# Patient Record
Sex: FEMALE | Race: WHITE | NOT HISPANIC OR LATINO | Employment: FULL TIME | URBAN - METROPOLITAN AREA
[De-identification: names, ages, dates, MRNs, and addresses within clinical notes are randomized per-mention and may not be internally consistent; named-entity substitution may affect disease eponyms.]

---

## 2017-05-22 ENCOUNTER — ALLSCRIPTS OFFICE VISIT (OUTPATIENT)
Dept: OTHER | Facility: OTHER | Age: 27
End: 2017-05-22

## 2017-05-24 ENCOUNTER — ALLSCRIPTS OFFICE VISIT (OUTPATIENT)
Dept: OTHER | Facility: OTHER | Age: 27
End: 2017-05-24

## 2017-06-09 ENCOUNTER — ALLSCRIPTS OFFICE VISIT (OUTPATIENT)
Dept: OTHER | Facility: OTHER | Age: 27
End: 2017-06-09

## 2017-06-13 ENCOUNTER — ALLSCRIPTS OFFICE VISIT (OUTPATIENT)
Dept: OTHER | Facility: OTHER | Age: 27
End: 2017-06-13

## 2017-06-19 ENCOUNTER — LAB CONVERSION - ENCOUNTER (OUTPATIENT)
Dept: OTHER | Facility: OTHER | Age: 27
End: 2017-06-19

## 2017-06-19 LAB
ADDITIONAL INFORMATION (HISTORICAL): NORMAL
ADEQUACY: (HISTORICAL): NORMAL
COMMENT (HISTORICAL): NORMAL
CYTOTECHNOLOGIST: (HISTORICAL): NORMAL
HPV MRNA E6/E7 (HISTORICAL): NOT DETECTED
INTERPRETATION (HISTORICAL): NORMAL
LMP (HISTORICAL): NORMAL
PREV. BX: (HISTORICAL): NORMAL
PREV. PAP (HISTORICAL): NORMAL
REVIEWED BY (HISTORICAL): NORMAL
SOURCE (HISTORICAL): NORMAL

## 2017-09-26 ENCOUNTER — GENERIC CONVERSION - ENCOUNTER (OUTPATIENT)
Dept: OTHER | Facility: OTHER | Age: 27
End: 2017-09-26

## 2017-10-09 ENCOUNTER — ALLSCRIPTS OFFICE VISIT (OUTPATIENT)
Dept: OTHER | Facility: OTHER | Age: 27
End: 2017-10-09

## 2017-10-12 LAB
EXTERNAL ABO GROUPING: NORMAL
EXTERNAL ANTIBODY SCREEN: NORMAL
EXTERNAL HEMATOCRIT: 39.4 %
EXTERNAL HEMOGLOBIN: 13.1 G/DL
EXTERNAL HEPATITIS B SURFACE ANTIGEN: NEGATIVE
EXTERNAL HIV-1 ANTIBODY: NEGATIVE
EXTERNAL PLATELET COUNT: 345 K/ΜL
EXTERNAL RH FACTOR: POSITIVE
EXTERNAL RUBELLA IGG QUANTITATION: NORMAL

## 2017-10-13 ENCOUNTER — LAB CONVERSION - ENCOUNTER (OUTPATIENT)
Dept: OTHER | Facility: OTHER | Age: 27
End: 2017-10-13

## 2017-10-13 LAB
AB SCRN, RBC W/RFLX ID,TITER,AG (HISTORICAL): NORMAL
BACTERIA UR QL AUTO: ABNORMAL /HPF
BILIRUB UR QL STRIP: NEGATIVE
COLOR UR: YELLOW
COMMENT (HISTORICAL): CLEAR
FECAL OCCULT BLOOD DIAGNOSTIC (HISTORICAL): ABNORMAL
GLUCOSE (HISTORICAL): NEGATIVE
HYALINE CASTS #/AREA URNS LPF: ABNORMAL /LPF
KETONES UR STRIP-MCNC: NEGATIVE MG/DL
LEUKOCYTE ESTERASE UR QL STRIP: NEGATIVE
NITRITE UR QL STRIP: NEGATIVE
PH UR STRIP.AUTO: 6 [PH] (ref 5–8)
PROT UR STRIP-MCNC: NEGATIVE MG/DL
RBC (HISTORICAL): ABNORMAL /HPF
RPR SCREEN (HISTORICAL): NORMAL
SP GR UR STRIP.AUTO: 1.01 (ref 1–1.03)
SQUAMOUS EPITHELIAL CELLS (HISTORICAL): ABNORMAL /HPF
WBC # BLD AUTO: ABNORMAL /HPF

## 2017-10-16 ENCOUNTER — LAB CONVERSION - ENCOUNTER (OUTPATIENT)
Dept: OTHER | Facility: OTHER | Age: 27
End: 2017-10-16

## 2017-10-16 LAB
AB SCRN, RBC W/RFLX ID,TITER,AG (HISTORICAL): NORMAL
ANTI-NUCLEAR ANTIBODY (ANA) (HISTORICAL): NEGATIVE
BACTERIA UR QL AUTO: ABNORMAL /HPF
BILIRUB UR QL STRIP: NEGATIVE
COLOR UR: YELLOW
COMMENT (HISTORICAL): CLEAR
FECAL OCCULT BLOOD DIAGNOSTIC (HISTORICAL): ABNORMAL
GLUCOSE (HISTORICAL): NEGATIVE
HYALINE CASTS #/AREA URNS LPF: ABNORMAL /LPF
KETONES UR STRIP-MCNC: NEGATIVE MG/DL
LEUKOCYTE ESTERASE UR QL STRIP: NEGATIVE
NITRITE UR QL STRIP: NEGATIVE
PH UR STRIP.AUTO: 6 [PH] (ref 5–8)
PROT UR STRIP-MCNC: NEGATIVE MG/DL
RBC (HISTORICAL): ABNORMAL /HPF
RPR SCREEN (HISTORICAL): NORMAL
RUBELLA, IGG (HISTORICAL): <0.9 INDEX
SP GR UR STRIP.AUTO: 1.01 (ref 1–1.03)
SQUAMOUS EPITHELIAL CELLS (HISTORICAL): ABNORMAL /HPF
WBC # BLD AUTO: ABNORMAL /HPF

## 2017-10-17 ENCOUNTER — LAB CONVERSION - ENCOUNTER (OUTPATIENT)
Dept: OTHER | Facility: OTHER | Age: 27
End: 2017-10-17

## 2017-10-17 LAB
AB SCRN, RBC W/RFLX ID,TITER,AG (HISTORICAL): NORMAL
ABO GROUP BLD: NORMAL
ANTI-NUCLEAR ANTIBODY (ANA) (HISTORICAL): NEGATIVE
BACTERIA UR QL AUTO: ABNORMAL /HPF
BASOPHILS # BLD AUTO: 0.3 %
BASOPHILS # BLD AUTO: 39 CELLS/UL (ref 0–200)
BILIRUB UR QL STRIP: NEGATIVE
COLOR UR: YELLOW
COMMENT (HISTORICAL): CLEAR
DEPRECATED RDW RBC AUTO: 12 % (ref 11–15)
EOSINOPHIL # BLD AUTO: 0.8 %
EOSINOPHIL # BLD AUTO: 105 CELLS/UL (ref 15–500)
FECAL OCCULT BLOOD DIAGNOSTIC (HISTORICAL): ABNORMAL
GLUCOSE (HISTORICAL): NEGATIVE
HCT VFR BLD AUTO: 39.4 % (ref 35–45)
HEPATITIS B SURFACE ANTIGEN (HISTORICAL): NORMAL
HGB BLD-MCNC: 13.1 G/DL (ref 11.7–15.5)
HIV AG/AB, 4TH GEN (HISTORICAL): NORMAL
HYALINE CASTS #/AREA URNS LPF: ABNORMAL /LPF
INTERPRETATION (HISTORICAL): NORMAL
KETONES UR STRIP-MCNC: NEGATIVE MG/DL
LEUKOCYTE ESTERASE UR QL STRIP: NEGATIVE
LYMPHOCYTES # BLD AUTO: 25.9 %
LYMPHOCYTES # BLD AUTO: 3393 CELLS/UL (ref 850–3900)
MCH RBC QN AUTO: 30.2 PG (ref 27–33)
MCHC RBC AUTO-ENTMCNC: 33.2 G/DL (ref 32–36)
MCV RBC AUTO: 90.8 FL (ref 80–100)
MISCELLANEOUS LAB TEST RESULT (HISTORICAL): NORMAL
MONOCYTES # BLD AUTO: 1205 CELLS/UL (ref 200–950)
MONOCYTES (HISTORICAL): 9.2 %
NEUTROPHILS # BLD AUTO: 63.8 %
NEUTROPHILS # BLD AUTO: 8358 CELLS/UL (ref 1500–7800)
NITRITE UR QL STRIP: NEGATIVE
PH UR STRIP.AUTO: 6 [PH] (ref 5–8)
PLATELET # BLD AUTO: 345 THOUSAND/UL (ref 140–400)
PMV BLD AUTO: 10.2 FL (ref 7.5–12.5)
PROT UR STRIP-MCNC: NEGATIVE MG/DL
RBC # BLD AUTO: 4.34 MILLION/UL (ref 3.8–5.1)
RBC (HISTORICAL): ABNORMAL /HPF
REVIEWED BY (HISTORICAL): NORMAL
RH BLD: NORMAL
RPR SCREEN (HISTORICAL): NORMAL
RUBELLA, IGG (HISTORICAL): <0.9 INDEX
SP GR UR STRIP.AUTO: 1.01 (ref 1–1.03)
SQUAMOUS EPITHELIAL CELLS (HISTORICAL): ABNORMAL /HPF
TOXOPLASMA GONDII IGG (HISTORICAL): 34.1 IU/ML
TOXOPLASMA IGM ANTIBODY (HISTORICAL): <8 AU/ML
WBC # BLD AUTO: 13.1 THOUSAND/UL (ref 3.8–10.8)
WBC # BLD AUTO: ABNORMAL /HPF

## 2017-10-31 ENCOUNTER — ALLSCRIPTS OFFICE VISIT (OUTPATIENT)
Dept: OTHER | Facility: OTHER | Age: 27
End: 2017-10-31

## 2017-10-31 LAB
EXTERNAL CHLAMYDIA SCREEN: NEGATIVE
EXTERNAL GONORRHEA SCREEN: NEGATIVE
EXTERNAL SYPHILIS RPR SCREEN: NORMAL

## 2017-11-03 LAB
CLINICAL COMMENT (HISTORICAL): NORMAL
CULT RSLT GENITAL (HISTORICAL): NORMAL

## 2017-11-13 ENCOUNTER — GENERIC CONVERSION - ENCOUNTER (OUTPATIENT)
Dept: OTHER | Facility: OTHER | Age: 27
End: 2017-11-13

## 2017-11-28 ENCOUNTER — GENERIC CONVERSION - ENCOUNTER (OUTPATIENT)
Dept: OTHER | Facility: OTHER | Age: 27
End: 2017-11-28

## 2017-12-04 ENCOUNTER — ALLSCRIPTS OFFICE VISIT (OUTPATIENT)
Dept: OTHER | Facility: OTHER | Age: 27
End: 2017-12-04

## 2017-12-18 ENCOUNTER — ALLSCRIPTS OFFICE VISIT (OUTPATIENT)
Dept: OTHER | Facility: OTHER | Age: 27
End: 2017-12-18

## 2017-12-26 ENCOUNTER — GENERIC CONVERSION - ENCOUNTER (OUTPATIENT)
Dept: OTHER | Facility: OTHER | Age: 27
End: 2017-12-26

## 2017-12-26 DIAGNOSIS — Z34.90 ENCOUNTER FOR SUPERVISION OF NORMAL PREGNANCY: ICD-10-CM

## 2018-01-09 ENCOUNTER — LAB CONVERSION - ENCOUNTER (OUTPATIENT)
Dept: OTHER | Facility: OTHER | Age: 28
End: 2018-01-09

## 2018-01-09 LAB — QUESTION/PROBLEM (HISTORICAL): NORMAL

## 2018-01-13 VITALS
SYSTOLIC BLOOD PRESSURE: 132 MMHG | WEIGHT: 146.13 LBS | HEIGHT: 62 IN | BODY MASS INDEX: 26.89 KG/M2 | DIASTOLIC BLOOD PRESSURE: 86 MMHG

## 2018-01-13 VITALS
HEIGHT: 62 IN | SYSTOLIC BLOOD PRESSURE: 120 MMHG | WEIGHT: 148.5 LBS | BODY MASS INDEX: 27.33 KG/M2 | DIASTOLIC BLOOD PRESSURE: 76 MMHG

## 2018-01-13 VITALS
HEIGHT: 62 IN | SYSTOLIC BLOOD PRESSURE: 120 MMHG | WEIGHT: 146.5 LBS | BODY MASS INDEX: 26.96 KG/M2 | DIASTOLIC BLOOD PRESSURE: 78 MMHG

## 2018-01-13 VITALS
WEIGHT: 148 LBS | HEIGHT: 62 IN | BODY MASS INDEX: 27.23 KG/M2 | SYSTOLIC BLOOD PRESSURE: 120 MMHG | DIASTOLIC BLOOD PRESSURE: 68 MMHG

## 2018-01-13 NOTE — PROGRESS NOTES
Assessment    1  Current tobacco use (305 1) (Z72 0)   2  Encounter for preventive health examination (V70 0) (Z00 00)    Plan  Influenza vaccine needed    · Fluzone Quadrivalent 0 5 ML Intramuscular Suspension    Discussion/Summary  health maintenance visit Currently, she eats an adequate diet  cervical cancer screening is current Sees Dr Rosemary Valle, gyn, yearly PAPs given hx of abn paps s/p colposcopy Breast cancer screening: breast cancer screening is not indicated  Colorectal cancer screening: colorectal cancer screening is not indicated  Osteoporosis screening: bone mineral density testing is not indicated  Advice and education were given regarding tobacco cessation  Patient discussion: discussed with the patient  31 yr old female here for annual exam, doing well, discussed tobacco cessation at length, not interested in patch or chantix at this time, only smoking rarely  Will continue to follow up with pt to see how quitting efforts are going  Continue with gyn care at Dr Rosemary Valle office  No school or work forms needed today  RTC in 1 yr for annual physical exam or sooner as needed  Chief Complaint  annual physical exam      History of Present Illness  HM, Adult Female: The last health maintenance visit was 1 year(s) ago  General Health: The patient's health since the last visit is described as good  She has regular dental visits  She denies hearing loss  Immunizations status: up to date   wears glasses  Lifestyle:  She consumes a diverse and healthy diet  She does not have any weight concerns  She exercises regularly  She uses tobacco  She denies drug use  Screening:   HPI: 31 yr old female here for annual physical exam, no acute concerns or complaints, doing well  Does admit she has picked up smoking, occasional cigarettes, usually 2 or 3 per day when she does smoke, usually when she is in her car  Helps to keep her calm  Work is her biggest stressor, works at [employer]        Review of Systems    Constitutional: not feeling poorly and not feeling tired  Eyes: no dryness of the eyes and no purulent discharge from the eyes  ENT: no nosebleeds, no hearing loss and no nasal discharge  Cardiovascular: no chest pain and no palpitations  Respiratory: no shortness of breath, no cough and no wheezing  Gastrointestinal: no abdominal pain, no nausea, no vomiting, no constipation and no diarrhea  Genitourinary: no dysuria  Musculoskeletal: no arthralgias  Integumentary: no rashes and no skin lesions  Neurological: no headache, no confusion, no dizziness, no limb weakness, no convulsions and no difficulty walking  Psychiatric: as noted in HPI, not suicidal, no sleep disturbances and no depression  Hematologic/Lymphatic: no tendency for easy bleeding and no tendency for easy bruising  Active Problems    1  Acute cystitis with hematuria (595 0) (N30 01)   2  Bacterial vaginosis (616 10,041 9) (N76 0,B96 89)   3  History of abnormal cervical Pap smear (V13 29) (T19 546)   4  Influenza vaccine needed (V04 81) (Z23)   5  UTI (urinary tract infection) (599 0) (N39 0)    Surgical History    · History of Colposcopy With Biopsy    Family History  Father    · Family history of factor V Leiden mutation (V18 3) (Z83 2)    Social History    · Never a smoker    Current Meds   1  NuvaRing 0 12-0 015 MG/24HR Vaginal Ring; Therapy: (Recorded:05Ogx1656) to Recorded    Allergies    1  No Known Drug Allergies    2  No Known Environmental Allergies   3  No Known Food Allergies    Vitals   Recorded: 17Nov2016 12:57PM   Temperature 98 2 F   Heart Rate 76   Respiration 18   Systolic 166   Diastolic 82   Height 5 ft 2 in   Weight 142 lb    BMI Calculated 25 97   BSA Calculated 1 65     Physical Exam    Constitutional   General appearance: No acute distress, well appearing and well nourished  Head and Face   Head and face: Normal     Palpation of the face and sinuses: No sinus tenderness      Eyes Conjunctiva and lids: No swelling, erythema or discharge  Pupils and irises: Equal, round, reactive to light  Ears, Nose, Mouth, and Throat   External inspection of ears and nose: Normal     Otoscopic examination: Tympanic membranes translucent with normal light reflex  Canals patent without erythema  Hearing: Normal     Nasal mucosa, septum, and turbinates: Normal without edema or erythema  Lips, teeth, and gums: Normal, good dentition  Oropharynx: Normal with no erythema, edema, exudate or lesions  Neck   Neck: Supple, symmetric, trachea midline, no masses  Pulmonary   Respiratory effort: No increased work of breathing or signs of respiratory distress  Auscultation of lungs: Clear to auscultation  Cardiovascular   Auscultation of heart: Normal rate and rhythm, normal S1 and S2, no murmurs  Lymphatic   Palpation of lymph nodes in neck: No lymphadenopathy  Musculoskeletal   Gait and station: Normal     Digits and nails: Normal without clubbing or cyanosis  Joints, bones, and muscles: Normal     Range of motion: Normal     Muscle strength/tone: Normal     Skin   Skin and subcutaneous tissue: Normal without rashes or lesions  Neurologic   Cranial nerves: Cranial nerves II-XII intact  Psychiatric   Mood and affect: Normal        Results/Data  PHQ-9 Adult Depression Screening [id number] 01:42PM [facility]     Test Name Result Flag Reference   PHQ-9 Adult Depression Score 3     Over the last two weeks, how often have you been bothered by any of the following problems?   Little interest or pleasure in doing things: Not at all - 0  Feeling down, depressed, or hopeless: Several days - 1  Trouble falling or staying asleep, or sleeping too much: Several days - 1  Feeling tired or having little energy: Several days - 1  Poor appetite or over eating: Not at all - 0  Feeling bad about yourself - or that you are a failure or have let yourself or your family down: Not at all - 0  Trouble concentrating on things, such as reading the newspaper or watching television: Not at all - 0  Moving or speaking so slowly that other people could have noticed  Or the opposite -  being so fidgety or restless that you have been moving around a lot more than usual: Not at all - 0  Thoughts that you would be better off dead, or of hurting yourself in some way: Not at all - 0   PHQ-9 Adult Depression Screening Negative     PHQ-9 Difficulty Level Somewhat difficult     PHQ-9 Severity Minimal Depression         Attending Note  Attending Note: Attending Note: I discussed the case with the Resident and reviewed the Resident's note and I agree with the Resident management plan as it was presented to me  Level of Participation: I was present in clinic, but did not examine the patient  Diagnosis and Plan: Health maintenance: encourage smoking cessation  I agree with the Resident's note        Signatures   Electronically signed by : Alexandro Mandujano MD; Nov 28 2016  1:47PM EST                       (Author)    Electronically signed by : ALEC Craven ; Nov 30 2016  1:27PM EST                       (Author)

## 2018-01-14 VITALS
WEIGHT: 146.38 LBS | SYSTOLIC BLOOD PRESSURE: 132 MMHG | HEIGHT: 62 IN | BODY MASS INDEX: 26.94 KG/M2 | DIASTOLIC BLOOD PRESSURE: 84 MMHG

## 2018-01-14 VITALS
WEIGHT: 152.25 LBS | SYSTOLIC BLOOD PRESSURE: 124 MMHG | HEIGHT: 62 IN | DIASTOLIC BLOOD PRESSURE: 70 MMHG | BODY MASS INDEX: 28.02 KG/M2

## 2018-01-15 ENCOUNTER — ALLSCRIPTS OFFICE VISIT (OUTPATIENT)
Dept: PERINATAL CARE | Facility: CLINIC | Age: 28
End: 2018-01-15
Payer: COMMERCIAL

## 2018-01-15 ENCOUNTER — GENERIC CONVERSION - ENCOUNTER (OUTPATIENT)
Dept: OTHER | Facility: OTHER | Age: 28
End: 2018-01-15

## 2018-01-15 PROCEDURE — 76805 OB US >/= 14 WKS SNGL FETUS: CPT | Performed by: OBSTETRICS & GYNECOLOGY

## 2018-01-15 PROCEDURE — 76817 TRANSVAGINAL US OBSTETRIC: CPT | Performed by: OBSTETRICS & GYNECOLOGY

## 2018-01-15 NOTE — MISCELLANEOUS
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

## 2018-01-16 ENCOUNTER — OB ABSTRACT (OUTPATIENT)
Dept: OBGYN CLINIC | Facility: CLINIC | Age: 28
End: 2018-01-16

## 2018-01-22 VITALS
BODY MASS INDEX: 26.8 KG/M2 | HEIGHT: 64 IN | DIASTOLIC BLOOD PRESSURE: 70 MMHG | WEIGHT: 157 LBS | SYSTOLIC BLOOD PRESSURE: 128 MMHG

## 2018-01-22 VITALS
DIASTOLIC BLOOD PRESSURE: 60 MMHG | SYSTOLIC BLOOD PRESSURE: 120 MMHG | WEIGHT: 152.38 LBS | BODY MASS INDEX: 28.04 KG/M2 | HEIGHT: 62 IN

## 2018-01-23 VITALS
DIASTOLIC BLOOD PRESSURE: 60 MMHG | RESPIRATION RATE: 16 BRPM | WEIGHT: 157.5 LBS | SYSTOLIC BLOOD PRESSURE: 112 MMHG | HEART RATE: 72 BPM | BODY MASS INDEX: 26.89 KG/M2 | TEMPERATURE: 97.2 F | HEIGHT: 64 IN

## 2018-01-23 NOTE — MISCELLANEOUS
Message  Return to work or school:   Alireza Crowder is under my professional care   She was seen in my office on 12/18/2017    She is not able to return to work until 12/19/2017           Signatures   Electronically signed by : ALEC Beltran ; Dec 18 2017  2:55PM EST                       (Author)

## 2018-01-23 NOTE — PROGRESS NOTES
XIANG 15 2018         RE: Radha Vargas                                To: [facility]   MR#: [id number]                                   [address]   : [address]: [id number]                             Fax: [phone]   (Exam #: [id number])      The LMP of this 28year old,  G1, P0-0-0-0 patient was AUG 14 2017, her   working RAFFY is 2018 and the current gestational age is 25 weeks 0   days by [address] [place] [address]  A sonographic examination was performed on XIANG   15 2018 using real time equipment  The ultrasound examination was   performed using abdominal & vaginal techniques  The patient has a BMI of   28 3  Her blood pressure today was 136/67  Earliest US on record:  10/31/17  9w1d  RAFFY  18  Cardiac motion was observed at 148 bpm       INDICATIONS      fetal anatomical survey      Exam Types      LEVEL II   Transvaginal      RESULTS      Fetus # 1 of 1   Vertex presentation   Fetal growth appeared normal   Placenta Location = Posterior, right lateral   No placenta previa   Placenta Grade = I      MEASUREMENTS (* Included In Average GA)      AC              15 6 cm        20 weeks 3 days* (63%)   BPD              4 9 cm        20 weeks 6 days* (77%)   HC              18 2 cm        20 weeks 4 days* (65%)   Femur            3 4 cm        21 weeks 0 days* (64%)      Nuchal Fold      3 8 mm   NBL              8 2 mm      Humerus          3 1 cm        20 weeks 2 days  (64%)      Cerebellum       2 4 cm        22 weeks 6 days   CisternaMagna    5 7 mm      HC/AC           1 17   FL/AC           0 22   FL/BPD          0 71   EFW (Ac/Fl/Hc)   372 grams - 0 lbs 13 oz      THE AVERAGE GESTATIONAL AGE is 20 weeks 5 days +/- 10 days        AMNIOTIC FLUID         Largest Vertical Pocket = 6 3 cm   Amniotic Fluid: Normal      CERVICAL EVALUATION      SUPINE      Cervical Length: 4 10 cm      OTHER TEST RESULTS Funneling?: No             Dynamic Changes?: No        Resp  To TFP?: No      ANATOMY      Head                                    Normal   Face/Neck                               Normal   Th  Cav  Normal   Heart                                   See Details   Abd  Cav  Normal   Stomach                                 Normal   Right Kidney                            Normal   Left Kidney                             Normal   Bladder                                 Normal   Abd  Wall                               Normal   Spine                                   Normal   Extrems                                 Normal   Genitalia                               Normal   Placenta                                Normal   Umbl  Cord                              Normal   Uterus                                  Normal   PCI                                     Normal      ANATOMY DETAILS      Visualized Appearing Sonographically Normal:   HEAD: (Calvarium, BPD Level, Cavum, Lateral Ventricles, Choroid Plexus,   Cerebellum, Cisterna Magna);    FACE/NECK: (Neck, Nuchal Fold, Profile,   Orbits, Nose/Lips, Palate, Face);    TH  CAV  : (Lungs, Diaphragm); HEART: (Four Chamber View, Proximal Left Outflow, Proximal Right Outflow,   3VV, 3 Vessel Trachea, Short Axis of Greater Vessels, Aortic Arch,   Interventricular Septum, Interatrial Septum, IVC, SVC, Cardiac Axis,   Cardiac Position);    ABD  CAV : (Liver, Gall Bladder);    STOMACH, RIGHT   KIDNEY, LEFT KIDNEY, BLADDER, ABD  WALL, SPINE: (Cervical Spine, Thoracic   Spine, Lumbar Spine, Sacrum);    EXTREMS: (Lt Humerus, Rt Humerus, Lt   Forearm, Rt Forearm, Lt Hand, Rt Hand, Lt Femur, Rt Femur, Lt Low Leg, Rt   Low Leg, Lt Foot, Rt Foot);    GENITALIA, PLACENTA, UMBL   CORD, UTERUS, PCI      Suboptimally Visualized:   HEART: (Ductal Arch)      ADNEXA      The left ovary appeared normal and measured 2 9 x 3 1 x 2 5 cm with a   volume of 11 6 cc  The right ovary was not visualized  IMPRESSION      Christopher IUP   20 weeks and 5 days by this ultrasound  (RAFFY=MAY 30 2018)   20 weeks and 0 days by 1st Tri Sono  (RAFFY=2018)   Vertex presentation   Fetal growth appeared normal   Regular fetal heart rate of 148 bpm   Posterior, right lateral placenta   No placenta previa      GENERAL COMMENT      Ms Genevia Fleischer is here for anatomy survey and cervical length screening  There is a single live intrauterine pregnancy with size equivalent to   dates  No gross anomalies were identified  Amniotic fluid is within normal limits  Transvaginal cervical length measures 41mm indicating low risk for   spontaneous  birth  Evaluation and Management:   The patient was counseled regarding the above findings  A total of 10   minutes were spent in this encounter with >50% of the time spent in   face-to-face counseling and in coordination of care  The limitations of   ultrasound were reviewed  We discussed that while the presence of a normal appearing ultrasound is   reassuring, it does not completely preclude the presence of aneuploidy or   malformation  Quad Screen results are interpreted as Screen Negative   based on risk of Down syndrome of <1:5000, risk of trisomy 18 of <1:5000,   and risk of open neural tube defect of 1:1853  I strongly recommended consideration of influenza vaccination for her   partner  At the conclusion of today's encounter, all questions were answered to her   satisfaction  Thank you very much for this kind referral and please do   not hesitate to contact me with any further questions or concerns  RISHI Rocha M D     Maternal Fetal Medicine   Electronically signed 01/15/18 11:17
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
## 2018-01-24 VITALS
DIASTOLIC BLOOD PRESSURE: 67 MMHG | HEIGHT: 64 IN | BODY MASS INDEX: 28.17 KG/M2 | SYSTOLIC BLOOD PRESSURE: 136 MMHG | WEIGHT: 165 LBS

## 2018-01-24 VITALS
HEIGHT: 64 IN | DIASTOLIC BLOOD PRESSURE: 70 MMHG | SYSTOLIC BLOOD PRESSURE: 132 MMHG | BODY MASS INDEX: 27.02 KG/M2 | WEIGHT: 158.25 LBS

## 2018-01-25 ENCOUNTER — ROUTINE PRENATAL (OUTPATIENT)
Dept: OBGYN CLINIC | Facility: CLINIC | Age: 28
End: 2018-01-25

## 2018-01-25 VITALS — WEIGHT: 162 LBS | SYSTOLIC BLOOD PRESSURE: 110 MMHG | BODY MASS INDEX: 28.25 KG/M2 | DIASTOLIC BLOOD PRESSURE: 74 MMHG

## 2018-01-25 DIAGNOSIS — Z3A.23 23 WEEKS GESTATION OF PREGNANCY: Primary | ICD-10-CM

## 2018-01-25 PROCEDURE — PNV: Performed by: NURSE PRACTITIONER

## 2018-02-22 ENCOUNTER — ROUTINE PRENATAL (OUTPATIENT)
Dept: OBGYN CLINIC | Facility: CLINIC | Age: 28
End: 2018-02-22

## 2018-02-22 VITALS — SYSTOLIC BLOOD PRESSURE: 108 MMHG | WEIGHT: 168.4 LBS | DIASTOLIC BLOOD PRESSURE: 60 MMHG | BODY MASS INDEX: 29.36 KG/M2

## 2018-02-22 DIAGNOSIS — Z34.02 ENCOUNTER FOR SUPERVISION OF NORMAL FIRST PREGNANCY IN SECOND TRIMESTER: Primary | ICD-10-CM

## 2018-02-22 PROCEDURE — PNV: Performed by: NURSE PRACTITIONER

## 2018-03-07 NOTE — PROGRESS NOTES
Education  Baby & Me Education 1st Trimester:   First Trimester Education provided:   benefits of breastfeeding, importance of exclusive breastfeeding, early initiation of breastfeeding and exclusive breastfeeding for the first 6 months   The patient is planning on breastfeeding     Prenatal education provided by: Donita King RN      Signatures   Electronically signed by : Donita King RN; Oct  9 2017  4:52PM EST                       (Author)

## 2018-03-09 LAB
EXTERNAL HEMATOCRIT: 35.9 %
EXTERNAL HEMOGLOBIN: 12.1 G/DL
EXTERNAL PLATELET COUNT: 324 K/ΜL
EXTERNAL SYPHILIS RPR SCREEN: NORMAL

## 2018-03-12 ENCOUNTER — ROUTINE PRENATAL (OUTPATIENT)
Dept: OBGYN CLINIC | Facility: CLINIC | Age: 28
End: 2018-03-12

## 2018-03-12 VITALS — BODY MASS INDEX: 29.75 KG/M2 | WEIGHT: 170.6 LBS | DIASTOLIC BLOOD PRESSURE: 78 MMHG | SYSTOLIC BLOOD PRESSURE: 110 MMHG

## 2018-03-12 DIAGNOSIS — Z34.82 PRENATAL CARE, SUBSEQUENT PREGNANCY, SECOND TRIMESTER: ICD-10-CM

## 2018-03-12 DIAGNOSIS — Z36.9 ANTENATAL SCREENING ENCOUNTER: Primary | ICD-10-CM

## 2018-03-12 LAB
HCT VFR BLD AUTO: 35.9 % (ref 35–45)
HGB BLD-MCNC: 12.1 G/DL (ref 11.7–15.5)
PLATELET # BLD AUTO: 324 THOUSAND/UL (ref 140–400)
RPR SER QL: NORMAL

## 2018-03-12 PROCEDURE — PNV: Performed by: OBSTETRICS & GYNECOLOGY

## 2018-03-12 NOTE — PROGRESS NOTES
The 28 week lab work was reviewed, patient still needs to make arrangements for Glucola    She will be going to [place] for vacation precautions were given

## 2018-03-27 ENCOUNTER — OB ABSTRACT (OUTPATIENT)
Dept: OBGYN CLINIC | Facility: CLINIC | Age: 28
End: 2018-03-27

## 2018-03-27 LAB — GLUCOSE 1H P 50 G GLC PO SERPL-MCNC: 102 MG/DL

## 2018-03-29 ENCOUNTER — TELEPHONE (OUTPATIENT)
Dept: OBGYN CLINIC | Facility: CLINIC | Age: 28
End: 2018-03-29
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
## 2018-03-30 ENCOUNTER — HOSPITAL ENCOUNTER (OUTPATIENT)
Facility: HOSPITAL | Age: 28
Discharge: HOME | End: 2018-03-30
Attending: OBSTETRICS & GYNECOLOGY | Admitting: OBSTETRICS & GYNECOLOGY
Payer: COMMERCIAL

## 2018-03-30 ENCOUNTER — TELEPHONE (OUTPATIENT)
Dept: OBGYN CLINIC | Facility: CLINIC | Age: 28
End: 2018-03-30

## 2018-03-30 VITALS
WEIGHT: 173 LBS | HEIGHT: 62 IN | TEMPERATURE: 98.4 F | BODY MASS INDEX: 31.83 KG/M2 | SYSTOLIC BLOOD PRESSURE: 124 MMHG | RESPIRATION RATE: 18 BRPM | HEART RATE: 103 BPM | DIASTOLIC BLOOD PRESSURE: 82 MMHG

## 2018-03-30 PROCEDURE — 99214 OFFICE O/P EST MOD 30 MIN: CPT

## 2018-03-30 NOTE — TELEPHONE ENCOUNTER
Pt called to inform she is 30 weeks pregnant and bleeding like a period  Had last night and again this morning  Still actively bleeding  Spoke with Dr July Hall and said to send to triage  Patient and triage notified
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
## 2018-04-02 ENCOUNTER — ROUTINE PRENATAL (OUTPATIENT)
Dept: OBGYN CLINIC | Facility: CLINIC | Age: 28
End: 2018-04-02

## 2018-04-02 VITALS — SYSTOLIC BLOOD PRESSURE: 112 MMHG | WEIGHT: 173 LBS | BODY MASS INDEX: 31.64 KG/M2 | DIASTOLIC BLOOD PRESSURE: 72 MMHG

## 2018-04-02 DIAGNOSIS — Z34.03 ENCOUNTER FOR SUPERVISION OF NORMAL FIRST PREGNANCY IN THIRD TRIMESTER: Primary | ICD-10-CM

## 2018-04-02 PROCEDURE — PNV: Performed by: NURSE PRACTITIONER
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
## 2018-04-02 NOTE — PROGRESS NOTES
Patient is doing well  Denies LOF/Cramping  No bleeding since last week  Baby is moving well  She having a baby girl  Fetal kick counts reviewed  Sheet given  All questions and concerns addressed and patient verbalized understanding  Return in 2 weeks

## 2018-04-19 ENCOUNTER — ROUTINE PRENATAL (OUTPATIENT)
Dept: OBGYN CLINIC | Facility: CLINIC | Age: 28
End: 2018-04-19

## 2018-04-19 VITALS — BODY MASS INDEX: 32.37 KG/M2 | WEIGHT: 177 LBS | DIASTOLIC BLOOD PRESSURE: 82 MMHG | SYSTOLIC BLOOD PRESSURE: 128 MMHG

## 2018-04-19 DIAGNOSIS — Z34.03 ENCOUNTER FOR SUPERVISION OF NORMAL FIRST PREGNANCY IN THIRD TRIMESTER: Primary | ICD-10-CM

## 2018-04-19 PROCEDURE — PNV: Performed by: NURSE PRACTITIONER

## 2018-04-19 NOTE — PROGRESS NOTES
Patient is doing well  Denies LOF/Bleeding/Cramping  Patient c/o neck pain that shoots into her shoulder and back  This has been going on for 5 days  She has tried ice, heat and tylenol with temporary improvement  Some recommendations given today were a prenatal neck, upper back massage or physical therapy  Patient will try the prenatal massage  If no improvement she will consider physical therapy  Continue fetal kick counts  Return in 2 weeks

## 2018-05-02 DIAGNOSIS — Z36.85 ANTENATAL SCREENING FOR STREPTOCOCCUS B: Primary | ICD-10-CM

## 2018-05-03 ENCOUNTER — ROUTINE PRENATAL (OUTPATIENT)
Dept: OBGYN CLINIC | Facility: CLINIC | Age: 28
End: 2018-05-03

## 2018-05-03 VITALS — SYSTOLIC BLOOD PRESSURE: 120 MMHG | WEIGHT: 178 LBS | DIASTOLIC BLOOD PRESSURE: 80 MMHG | BODY MASS INDEX: 32.56 KG/M2

## 2018-05-03 DIAGNOSIS — Z36.85 ENCOUNTER FOR ANTENATAL SCREENING FOR STREPTOCOCCUS B: ICD-10-CM

## 2018-05-03 DIAGNOSIS — Z34.03 ENCOUNTER FOR SUPERVISION OF NORMAL FIRST PREGNANCY IN THIRD TRIMESTER: Primary | ICD-10-CM

## 2018-05-03 PROCEDURE — PNV: Performed by: NURSE PRACTITIONER

## 2018-05-03 NOTE — PROGRESS NOTES
Patient is doing well  Denies LOF/Bleeding/Cramping  Baby is moving well  GBS culture obtained  Getting ready for labor sheet reviewed with patient  All questions and concerns addressed and patient verbalized understanding  She is to return in one week

## 2018-05-08 ENCOUNTER — ROUTINE PRENATAL (OUTPATIENT)
Dept: OBGYN CLINIC | Facility: CLINIC | Age: 28
End: 2018-05-08

## 2018-05-08 VITALS — SYSTOLIC BLOOD PRESSURE: 134 MMHG | WEIGHT: 182.8 LBS | BODY MASS INDEX: 33.43 KG/M2 | DIASTOLIC BLOOD PRESSURE: 76 MMHG

## 2018-05-08 DIAGNOSIS — Z34.03 ENCOUNTER FOR SUPERVISION OF NORMAL FIRST PREGNANCY IN THIRD TRIMESTER: Primary | ICD-10-CM

## 2018-05-08 PROCEDURE — PNV: Performed by: OBSTETRICS & GYNECOLOGY

## 2018-05-08 NOTE — PROGRESS NOTES
Patient is doing well  Denies LOF/Bleeding  + Cheryle Whitehead  Baby is moving well  GBS negative  Continue fetal kick counts  Return in 1 week

## 2018-05-14 ENCOUNTER — ROUTINE PRENATAL (OUTPATIENT)
Dept: OBGYN CLINIC | Facility: CLINIC | Age: 28
End: 2018-05-14

## 2018-05-14 VITALS — WEIGHT: 181.2 LBS | DIASTOLIC BLOOD PRESSURE: 78 MMHG | BODY MASS INDEX: 33.14 KG/M2 | SYSTOLIC BLOOD PRESSURE: 120 MMHG

## 2018-05-14 DIAGNOSIS — Z34.83 PRENATAL CARE, SUBSEQUENT PREGNANCY, THIRD TRIMESTER: Primary | ICD-10-CM

## 2018-05-14 PROCEDURE — PNV: Performed by: OBSTETRICS & GYNECOLOGY

## 2018-05-14 NOTE — PATIENT INSTRUCTIONS
The patient was advised to make arrangements for check and that the labor and delivery suite, reviewed fetal kick counts

## 2018-05-23 ENCOUNTER — ROUTINE PRENATAL (OUTPATIENT)
Dept: OBGYN CLINIC | Facility: CLINIC | Age: 28
End: 2018-05-23

## 2018-05-23 VITALS — DIASTOLIC BLOOD PRESSURE: 82 MMHG | BODY MASS INDEX: 33 KG/M2 | SYSTOLIC BLOOD PRESSURE: 126 MMHG | WEIGHT: 180.4 LBS

## 2018-05-23 DIAGNOSIS — Z34.03 ENCOUNTER FOR SUPERVISION OF NORMAL FIRST PREGNANCY IN THIRD TRIMESTER: Primary | ICD-10-CM

## 2018-05-23 PROCEDURE — PNV: Performed by: OBSTETRICS & GYNECOLOGY

## 2018-05-23 NOTE — PROGRESS NOTES
Patient is doing well  Denies LOF/Bleeding/Cramping  Cervix: Posterior, soft, fingertip/40/-3  Baby is moving well  Continue fetal kick counts  Call with signs and symptoms of labor  Return in one week

## 2018-05-29 ENCOUNTER — ROUTINE PRENATAL (OUTPATIENT)
Dept: OBGYN CLINIC | Facility: CLINIC | Age: 28
End: 2018-05-29

## 2018-05-29 VITALS — WEIGHT: 181 LBS | SYSTOLIC BLOOD PRESSURE: 116 MMHG | BODY MASS INDEX: 33.11 KG/M2 | DIASTOLIC BLOOD PRESSURE: 80 MMHG

## 2018-05-29 DIAGNOSIS — Z34.03 ENCOUNTER FOR SUPERVISION OF NORMAL FIRST PREGNANCY IN THIRD TRIMESTER: Primary | ICD-10-CM

## 2018-05-29 PROCEDURE — PNV: Performed by: OBSTETRICS & GYNECOLOGY

## 2018-05-29 NOTE — PROGRESS NOTES
Patient is doing well  Denies LOF/Bleeding  Mild lower abdominal Cramping  Baby is moving well  Continue fetal kick counts  Call with signs of labor  Return in one week

## 2018-05-30 ENCOUNTER — CLINICAL SUPPORT (OUTPATIENT)
Dept: FAMILY MEDICINE CLINIC | Facility: CLINIC | Age: 28
End: 2018-05-30
Payer: COMMERCIAL

## 2018-05-30 DIAGNOSIS — Z23 NEED FOR TETANUS, DIPHTHERIA, AND ACELLULAR PERTUSSIS (TDAP) VACCINE: Primary | ICD-10-CM

## 2018-05-30 PROCEDURE — 90715 TDAP VACCINE 7 YRS/> IM: CPT | Performed by: FAMILY MEDICINE

## 2018-05-30 PROCEDURE — 90471 IMMUNIZATION ADMIN: CPT | Performed by: FAMILY MEDICINE

## 2018-06-04 ENCOUNTER — ANESTHESIA (INPATIENT)
Dept: LABOR AND DELIVERY | Facility: HOSPITAL | Age: 28
End: 2018-06-04
Payer: COMMERCIAL

## 2018-06-04 ENCOUNTER — HOSPITAL ENCOUNTER (INPATIENT)
Facility: HOSPITAL | Age: 28
LOS: 3 days | Discharge: HOME | End: 2018-06-07
Attending: OBSTETRICS & GYNECOLOGY | Admitting: OBSTETRICS & GYNECOLOGY
Payer: COMMERCIAL

## 2018-06-04 ENCOUNTER — ANESTHESIA EVENT (INPATIENT)
Dept: LABOR AND DELIVERY | Facility: HOSPITAL | Age: 28
End: 2018-06-04
Payer: COMMERCIAL

## 2018-06-04 DIAGNOSIS — Z3A.40 40 WEEKS GESTATION OF PREGNANCY: ICD-10-CM

## 2018-06-04 DIAGNOSIS — G89.18 POST-OP PAIN: Primary | ICD-10-CM

## 2018-06-04 PROBLEM — O47.9 UTERINE CONTRACTIONS DURING PREGNANCY: Status: ACTIVE | Noted: 2018-06-04

## 2018-06-04 PROBLEM — Z98.891 S/P PRIMARY LOW TRANSVERSE C-SECTION: Status: ACTIVE | Noted: 2018-06-04

## 2018-06-04 PROBLEM — O42.90 AMNIOTIC FLUID LEAKING: Status: ACTIVE | Noted: 2018-06-04

## 2018-06-04 PROBLEM — O41.1290 CHORIOAMNIONITIS: Status: ACTIVE | Noted: 2018-06-04

## 2018-06-04 PROBLEM — O09.899 RUBELLA NON-IMMUNE STATUS, ANTEPARTUM: Status: ACTIVE | Noted: 2018-06-04

## 2018-06-04 PROBLEM — Z28.39 RUBELLA NON-IMMUNE STATUS, ANTEPARTUM: Status: ACTIVE | Noted: 2018-06-04

## 2018-06-04 LAB
ABO GROUP BLD: NORMAL
BASE EXCESS BLDCOA CALC-SCNC: -2.9 MMOL/L (ref 3–11)
BASE EXCESS BLDCOV CALC-SCNC: -3.6 MMOL/L (ref 1–9)
BASOPHILS # BLD AUTO: 0.04 THOUSANDS/ΜL (ref 0–0.1)
BASOPHILS NFR BLD AUTO: 0 % (ref 0–1)
BLD GP AB SCN SERPL QL: NEGATIVE
EOSINOPHIL # BLD AUTO: 0.03 THOUSAND/ΜL (ref 0–0.61)
EOSINOPHIL NFR BLD AUTO: 0 % (ref 0–6)
ERYTHROCYTE [DISTWIDTH] IN BLOOD BY AUTOMATED COUNT: 13.1 % (ref 11.6–15.1)
HCO3 BLDCOA-SCNC: 22.8 MMOL/L (ref 17.3–27.3)
HCO3 BLDCOV-SCNC: 20.9 MMOL/L (ref 12.2–28.6)
HCT VFR BLD AUTO: 43.5 % (ref 34.8–46.1)
HGB BLD-MCNC: 14.6 G/DL (ref 11.5–15.4)
IMM GRANULOCYTES # BLD AUTO: 0.08 THOUSAND/UL (ref 0–0.2)
IMM GRANULOCYTES NFR BLD AUTO: 1 % (ref 0–2)
LYMPHOCYTES # BLD AUTO: 2.25 THOUSANDS/ΜL (ref 0.6–4.47)
LYMPHOCYTES NFR BLD AUTO: 13 % (ref 14–44)
MCH RBC QN AUTO: 31.5 PG (ref 26.8–34.3)
MCHC RBC AUTO-ENTMCNC: 33.6 G/DL (ref 31.4–37.4)
MCV RBC AUTO: 94 FL (ref 82–98)
MONOCYTES # BLD AUTO: 1.04 THOUSAND/ΜL (ref 0.17–1.22)
MONOCYTES NFR BLD AUTO: 6 % (ref 4–12)
NEUTROPHILS # BLD AUTO: 13.41 THOUSANDS/ΜL (ref 1.85–7.62)
NEUTS SEG NFR BLD AUTO: 80 % (ref 43–75)
NRBC BLD AUTO-RTO: 0 /100 WBCS
O2 CT VFR BLDCOA CALC: 6.4 ML/DL
OXYHGB MFR BLDCOA: 29.5 %
OXYHGB MFR BLDCOV: 51 %
PCO2 BLDCOA: 43.1 MM[HG] (ref 30–60)
PCO2 BLDCOV: 36.7 MM HG (ref 27–43)
PH BLDCOA: 7.34 [PH] (ref 7.23–7.43)
PH BLDCOV: 7.37 [PH] (ref 7.19–7.49)
PLATELET # BLD AUTO: 328 THOUSANDS/UL (ref 149–390)
PMV BLD AUTO: 9.8 FL (ref 8.9–12.7)
PO2 BLDCOA: 14.5 MM HG (ref 5–25)
PO2 BLDCOV: 20.7 MM HG (ref 15–45)
RBC # BLD AUTO: 4.64 MILLION/UL (ref 3.81–5.12)
RH BLD: POSITIVE
SAO2 % BLDCOV: 11.1 ML/DL
SPECIMEN EXPIRATION DATE: NORMAL
WBC # BLD AUTO: 16.85 THOUSAND/UL (ref 4.31–10.16)

## 2018-06-04 PROCEDURE — 99204 OFFICE O/P NEW MOD 45 MIN: CPT

## 2018-06-04 PROCEDURE — 94762 N-INVAS EAR/PLS OXIMTRY CONT: CPT

## 2018-06-04 PROCEDURE — 86850 RBC ANTIBODY SCREEN: CPT | Performed by: OBSTETRICS & GYNECOLOGY

## 2018-06-04 PROCEDURE — 88307 TISSUE EXAM BY PATHOLOGIST: CPT | Performed by: PATHOLOGY

## 2018-06-04 PROCEDURE — 59510 CESAREAN DELIVERY: CPT | Performed by: OBSTETRICS & GYNECOLOGY

## 2018-06-04 PROCEDURE — 85025 COMPLETE CBC W/AUTO DIFF WBC: CPT | Performed by: OBSTETRICS & GYNECOLOGY

## 2018-06-04 PROCEDURE — 4A1HXCZ MONITORING OF PRODUCTS OF CONCEPTION, CARDIAC RATE, EXTERNAL APPROACH: ICD-10-PCS | Performed by: OBSTETRICS & GYNECOLOGY

## 2018-06-04 PROCEDURE — 82805 BLOOD GASES W/O2 SATURATION: CPT | Performed by: OBSTETRICS & GYNECOLOGY

## 2018-06-04 PROCEDURE — 86901 BLOOD TYPING SEROLOGIC RH(D): CPT | Performed by: OBSTETRICS & GYNECOLOGY

## 2018-06-04 PROCEDURE — 86900 BLOOD TYPING SEROLOGIC ABO: CPT | Performed by: OBSTETRICS & GYNECOLOGY

## 2018-06-04 PROCEDURE — 86592 SYPHILIS TEST NON-TREP QUAL: CPT | Performed by: OBSTETRICS & GYNECOLOGY

## 2018-06-04 RX ORDER — DEXAMETHASONE SODIUM PHOSPHATE 4 MG/ML
INJECTION, SOLUTION INTRA-ARTICULAR; INTRALESIONAL; INTRAMUSCULAR; INTRAVENOUS; SOFT TISSUE AS NEEDED
Status: DISCONTINUED | OUTPATIENT
Start: 2018-06-04 | End: 2018-06-04 | Stop reason: SURG

## 2018-06-04 RX ORDER — SIMETHICONE 80 MG
80 TABLET,CHEWABLE ORAL 4 TIMES DAILY PRN
Status: DISCONTINUED | OUTPATIENT
Start: 2018-06-04 | End: 2018-06-07 | Stop reason: HOSPADM

## 2018-06-04 RX ORDER — MAGNESIUM HYDROXIDE/ALUMINUM HYDROXICE/SIMETHICONE 120; 1200; 1200 MG/30ML; MG/30ML; MG/30ML
15 SUSPENSION ORAL EVERY 6 HOURS PRN
Status: DISCONTINUED | OUTPATIENT
Start: 2018-06-04 | End: 2018-06-07 | Stop reason: HOSPADM

## 2018-06-04 RX ORDER — ONDANSETRON 2 MG/ML
4 INJECTION INTRAMUSCULAR; INTRAVENOUS EVERY 4 HOURS PRN
Status: ACTIVE | OUTPATIENT
Start: 2018-06-04 | End: 2018-06-05

## 2018-06-04 RX ORDER — CALCIUM CARBONATE 200(500)MG
1000 TABLET,CHEWABLE ORAL DAILY PRN
Status: DISCONTINUED | OUTPATIENT
Start: 2018-06-04 | End: 2018-06-07 | Stop reason: HOSPADM

## 2018-06-04 RX ORDER — METOCLOPRAMIDE HYDROCHLORIDE 5 MG/ML
5 INJECTION INTRAMUSCULAR; INTRAVENOUS EVERY 6 HOURS PRN
Status: ACTIVE | OUTPATIENT
Start: 2018-06-04 | End: 2018-06-05

## 2018-06-04 RX ORDER — NALOXONE HYDROCHLORIDE 0.4 MG/ML
0.1 INJECTION, SOLUTION INTRAMUSCULAR; INTRAVENOUS; SUBCUTANEOUS
Status: ACTIVE | OUTPATIENT
Start: 2018-06-04 | End: 2018-06-05

## 2018-06-04 RX ORDER — OXYTOCIN/RINGER'S LACTATE 30/500 ML
1-30 PLASTIC BAG, INJECTION (ML) INTRAVENOUS
Status: DISCONTINUED | OUTPATIENT
Start: 2018-06-04 | End: 2018-06-04

## 2018-06-04 RX ORDER — ONDANSETRON 2 MG/ML
4 INJECTION INTRAMUSCULAR; INTRAVENOUS ONCE AS NEEDED
Status: DISCONTINUED | OUTPATIENT
Start: 2018-06-04 | End: 2018-06-04

## 2018-06-04 RX ORDER — IBUPROFEN 600 MG/1
600 TABLET ORAL EVERY 6 HOURS PRN
Status: DISCONTINUED | OUTPATIENT
Start: 2018-06-04 | End: 2018-06-07 | Stop reason: HOSPADM

## 2018-06-04 RX ORDER — ONDANSETRON 2 MG/ML
4 INJECTION INTRAMUSCULAR; INTRAVENOUS EVERY 6 HOURS PRN
Status: DISCONTINUED | OUTPATIENT
Start: 2018-06-04 | End: 2018-06-04

## 2018-06-04 RX ORDER — FENTANYL CITRATE/PF 50 MCG/ML
25 SYRINGE (ML) INJECTION
Status: DISCONTINUED | OUTPATIENT
Start: 2018-06-04 | End: 2018-06-04

## 2018-06-04 RX ORDER — NALBUPHINE HCL 10 MG/ML
5 AMPUL (ML) INJECTION
Status: DISPENSED | OUTPATIENT
Start: 2018-06-04 | End: 2018-06-05

## 2018-06-04 RX ORDER — MORPHINE SULFATE 0.5 MG/ML
INJECTION, SOLUTION EPIDURAL; INTRATHECAL; INTRAVENOUS AS NEEDED
Status: DISCONTINUED | OUTPATIENT
Start: 2018-06-04 | End: 2018-06-04 | Stop reason: SURG

## 2018-06-04 RX ORDER — NALBUPHINE HCL 10 MG/ML
5 AMPUL (ML) INJECTION
Status: DISCONTINUED | OUTPATIENT
Start: 2018-06-04 | End: 2018-06-07 | Stop reason: HOSPADM

## 2018-06-04 RX ORDER — ONDANSETRON 2 MG/ML
INJECTION INTRAMUSCULAR; INTRAVENOUS AS NEEDED
Status: DISCONTINUED | OUTPATIENT
Start: 2018-06-04 | End: 2018-06-04 | Stop reason: SURG

## 2018-06-04 RX ORDER — DOCUSATE SODIUM 100 MG/1
100 CAPSULE, LIQUID FILLED ORAL 2 TIMES DAILY
Status: DISCONTINUED | OUTPATIENT
Start: 2018-06-04 | End: 2018-06-07 | Stop reason: HOSPADM

## 2018-06-04 RX ORDER — DIPHENHYDRAMINE HYDROCHLORIDE 50 MG/ML
25 INJECTION INTRAMUSCULAR; INTRAVENOUS EVERY 6 HOURS PRN
Status: ACTIVE | OUTPATIENT
Start: 2018-06-04 | End: 2018-06-05

## 2018-06-04 RX ORDER — OXYCODONE HYDROCHLORIDE AND ACETAMINOPHEN 5; 325 MG/1; MG/1
1 TABLET ORAL EVERY 4 HOURS PRN
Status: DISCONTINUED | OUTPATIENT
Start: 2018-06-05 | End: 2018-06-07 | Stop reason: HOSPADM

## 2018-06-04 RX ORDER — DEXAMETHASONE SODIUM PHOSPHATE 4 MG/ML
8 INJECTION, SOLUTION INTRA-ARTICULAR; INTRALESIONAL; INTRAMUSCULAR; INTRAVENOUS; SOFT TISSUE ONCE AS NEEDED
Status: ACTIVE | OUTPATIENT
Start: 2018-06-04 | End: 2018-06-05

## 2018-06-04 RX ORDER — ACETAMINOPHEN 325 MG/1
650 TABLET ORAL EVERY 6 HOURS
Status: DISCONTINUED | OUTPATIENT
Start: 2018-06-04 | End: 2018-06-04

## 2018-06-04 RX ORDER — SODIUM CHLORIDE, SODIUM LACTATE, POTASSIUM CHLORIDE, CALCIUM CHLORIDE 600; 310; 30; 20 MG/100ML; MG/100ML; MG/100ML; MG/100ML
125 INJECTION, SOLUTION INTRAVENOUS CONTINUOUS
Status: DISCONTINUED | OUTPATIENT
Start: 2018-06-04 | End: 2018-06-07 | Stop reason: HOSPADM

## 2018-06-04 RX ORDER — LIDOCAINE HYDROCHLORIDE AND EPINEPHRINE 20; 5 MG/ML; UG/ML
INJECTION, SOLUTION EPIDURAL; INFILTRATION; INTRACAUDAL; PERINEURAL AS NEEDED
Status: DISCONTINUED | OUTPATIENT
Start: 2018-06-04 | End: 2018-06-04 | Stop reason: SURG

## 2018-06-04 RX ORDER — ONDANSETRON 2 MG/ML
4 INJECTION INTRAMUSCULAR; INTRAVENOUS EVERY 8 HOURS PRN
Status: DISCONTINUED | OUTPATIENT
Start: 2018-06-04 | End: 2018-06-07 | Stop reason: HOSPADM

## 2018-06-04 RX ORDER — PROMETHAZINE HYDROCHLORIDE 25 MG/ML
12.5 INJECTION, SOLUTION INTRAMUSCULAR; INTRAVENOUS ONCE AS NEEDED
Status: DISCONTINUED | OUTPATIENT
Start: 2018-06-04 | End: 2018-06-04

## 2018-06-04 RX ORDER — METOCLOPRAMIDE HYDROCHLORIDE 5 MG/ML
10 INJECTION INTRAMUSCULAR; INTRAVENOUS ONCE AS NEEDED
Status: DISCONTINUED | OUTPATIENT
Start: 2018-06-04 | End: 2018-06-04

## 2018-06-04 RX ORDER — ACETAMINOPHEN 325 MG/1
650 TABLET ORAL EVERY 6 HOURS PRN
Status: DISCONTINUED | OUTPATIENT
Start: 2018-06-04 | End: 2018-06-07 | Stop reason: HOSPADM

## 2018-06-04 RX ORDER — KETOROLAC TROMETHAMINE 30 MG/ML
30 INJECTION, SOLUTION INTRAMUSCULAR; INTRAVENOUS EVERY 6 HOURS
Status: COMPLETED | OUTPATIENT
Start: 2018-06-04 | End: 2018-06-05

## 2018-06-04 RX ORDER — OXYCODONE HYDROCHLORIDE AND ACETAMINOPHEN 5; 325 MG/1; MG/1
2 TABLET ORAL EVERY 4 HOURS PRN
Status: DISCONTINUED | OUTPATIENT
Start: 2018-06-04 | End: 2018-06-07 | Stop reason: HOSPADM

## 2018-06-04 RX ORDER — SODIUM CHLORIDE, SODIUM LACTATE, POTASSIUM CHLORIDE, CALCIUM CHLORIDE 600; 310; 30; 20 MG/100ML; MG/100ML; MG/100ML; MG/100ML
100 INJECTION, SOLUTION INTRAVENOUS CONTINUOUS
Status: DISCONTINUED | OUTPATIENT
Start: 2018-06-04 | End: 2018-06-04

## 2018-06-04 RX ORDER — SENNOSIDES 8.6 MG
1 TABLET ORAL DAILY
Status: DISCONTINUED | OUTPATIENT
Start: 2018-06-05 | End: 2018-06-07 | Stop reason: HOSPADM

## 2018-06-04 RX ADMIN — DOCUSATE SODIUM 100 MG: 100 CAPSULE, LIQUID FILLED ORAL at 22:05

## 2018-06-04 RX ADMIN — ONDANSETRON 4 MG: 2 INJECTION INTRAMUSCULAR; INTRAVENOUS at 16:20

## 2018-06-04 RX ADMIN — PHENYLEPHRINE HYDROCHLORIDE 20 MCG/MIN: 10 INJECTION INTRAVENOUS at 15:42

## 2018-06-04 RX ADMIN — Medication 2 MILLI-UNITS/MIN: at 07:32

## 2018-06-04 RX ADMIN — KETOROLAC TROMETHAMINE 30 MG: 30 INJECTION, SOLUTION INTRAMUSCULAR at 16:31

## 2018-06-04 RX ADMIN — METRONIDAZOLE 500 MG: 500 INJECTION, SOLUTION INTRAVENOUS at 15:50

## 2018-06-04 RX ADMIN — SODIUM CHLORIDE, SODIUM LACTATE, POTASSIUM CHLORIDE, AND CALCIUM CHLORIDE 125 ML/HR: .6; .31; .03; .02 INJECTION, SOLUTION INTRAVENOUS at 09:47

## 2018-06-04 RX ADMIN — LIDOCAINE HYDROCHLORIDE AND EPINEPHRINE 5 ML: 20; 5 INJECTION, SOLUTION EPIDURAL; INFILTRATION; INTRACAUDAL; PERINEURAL at 15:44

## 2018-06-04 RX ADMIN — DEXAMETHASONE SODIUM PHOSPHATE 8 MG: 4 INJECTION, SOLUTION INTRAMUSCULAR; INTRAVENOUS at 16:20

## 2018-06-04 RX ADMIN — KETOROLAC TROMETHAMINE 30 MG: 30 INJECTION, SOLUTION INTRAMUSCULAR at 22:45

## 2018-06-04 RX ADMIN — NALBUPHINE HYDROCHLORIDE 5 MG: 10 INJECTION, SOLUTION INTRAMUSCULAR; INTRAVENOUS; SUBCUTANEOUS at 18:32

## 2018-06-04 RX ADMIN — NALBUPHINE HYDROCHLORIDE 5 MG: 10 INJECTION, SOLUTION INTRAMUSCULAR; INTRAVENOUS; SUBCUTANEOUS at 22:04

## 2018-06-04 RX ADMIN — SODIUM CHLORIDE, SODIUM LACTATE, POTASSIUM CHLORIDE, AND CALCIUM CHLORIDE: .6; .31; .03; .02 INJECTION, SOLUTION INTRAVENOUS at 15:39

## 2018-06-04 RX ADMIN — LIDOCAINE HYDROCHLORIDE AND EPINEPHRINE 5 ML: 20; 5 INJECTION, SOLUTION EPIDURAL; INFILTRATION; INTRACAUDAL; PERINEURAL at 15:48

## 2018-06-04 RX ADMIN — NALBUPHINE HYDROCHLORIDE 5 MG: 10 INJECTION, SOLUTION INTRAMUSCULAR; INTRAVENOUS; SUBCUTANEOUS at 03:37

## 2018-06-04 RX ADMIN — SODIUM CHLORIDE, SODIUM LACTATE, POTASSIUM CHLORIDE, AND CALCIUM CHLORIDE: .6; .31; .03; .02 INJECTION, SOLUTION INTRAVENOUS at 16:36

## 2018-06-04 RX ADMIN — SODIUM CHLORIDE 3 G: 9 INJECTION, SOLUTION INTRAVENOUS at 13:40

## 2018-06-04 RX ADMIN — OXYTOCIN 250 MILLI-UNITS: 10 INJECTION, SOLUTION INTRAMUSCULAR; INTRAVENOUS at 16:11

## 2018-06-04 RX ADMIN — SODIUM CHLORIDE, SODIUM LACTATE, POTASSIUM CHLORIDE, AND CALCIUM CHLORIDE 125 ML/HR: .6; .31; .03; .02 INJECTION, SOLUTION INTRAVENOUS at 14:58

## 2018-06-04 RX ADMIN — MORPHINE SULFATE 1 MG: 0.5 INJECTION, SOLUTION EPIDURAL; INTRATHECAL; INTRAVENOUS at 16:35

## 2018-06-04 RX ADMIN — LIDOCAINE HYDROCHLORIDE AND EPINEPHRINE 10 ML: 20; 5 INJECTION, SOLUTION EPIDURAL; INFILTRATION; INTRACAUDAL; PERINEURAL at 15:53

## 2018-06-04 RX ADMIN — ROPIVACAINE HYDROCHLORIDE: 5 INJECTION, SOLUTION EPIDURAL; INFILTRATION; PERINEURAL at 03:09

## 2018-06-04 RX ADMIN — MORPHINE SULFATE 4 MG: 0.5 INJECTION, SOLUTION EPIDURAL; INTRATHECAL; INTRAVENOUS at 16:14

## 2018-06-04 RX ADMIN — CEFAZOLIN SODIUM 2000 MG: 10 INJECTION, POWDER, FOR SOLUTION INTRAVENOUS at 15:50

## 2018-06-04 RX ADMIN — SODIUM CHLORIDE, SODIUM LACTATE, POTASSIUM CHLORIDE, AND CALCIUM CHLORIDE 125 ML/HR: .6; .31; .03; .02 INJECTION, SOLUTION INTRAVENOUS at 02:57

## 2018-06-04 RX ADMIN — LIDOCAINE HYDROCHLORIDE AND EPINEPHRINE 10 ML: 20; 5 INJECTION, SOLUTION EPIDURAL; INFILTRATION; INTRACAUDAL; PERINEURAL at 16:04

## 2018-06-04 RX ADMIN — SODIUM CHLORIDE, SODIUM LACTATE, POTASSIUM CHLORIDE, AND CALCIUM CHLORIDE 999 ML/HR: .6; .31; .03; .02 INJECTION, SOLUTION INTRAVENOUS at 02:20

## 2018-06-04 RX ADMIN — SODIUM CHLORIDE, SODIUM LACTATE, POTASSIUM CHLORIDE, AND CALCIUM CHLORIDE 125 ML/HR: .6; .31; .03; .02 INJECTION, SOLUTION INTRAVENOUS at 12:45

## 2018-06-04 NOTE — PLAN OF CARE
ALTERATION IN THE BREAST     Optimize infant feeding at the breast Progressing        DISCHARGE PLANNING     Discharge to home or other facility with appropriate resources Progressing        INADEQUATE LATCH, SUCK OR SWALLOW     Demonstrate ability to latch and sustain latch, audible swallowing and satiety Progressing        INFECTION - ADULT     Absence or prevention of progression during hospitalization Progressing     Absence of fever/infection during neutropenic period Progressing        Knowledge Deficit     Verbalizes understanding of labor plan Progressing     Patient/family/caregiver demonstrates understanding of disease process, treatment plan, medications, and discharge instructions Progressing        PAIN - ADULT     Verbalizes/displays adequate comfort level or baseline comfort level Progressing        SAFETY ADULT     Patient will remain free of falls Progressing     Maintain or return to baseline ADL function Progressing     Maintain or return mobility status to optimal level Progressing

## 2018-06-04 NOTE — OB LABOR/OXYTOCIN SAFETY PROGRESS
Oxytocin Safety Progress Check Note - Lova Leyden 28 y o  female MRN: [id number]    Unit/Bed#: -01 Encounter: [id number]    Obstetric History       T0      L0     SAB0   TAB0   Ectopic0   Multiple0   Live Births0      Gestational Age: 37w0d  Dose (tasia-units/min) Oxytocin: 4 tasia-units/min  Contraction Frequency (minutes): 2-3  Contraction Quality: Moderate  Tachysystole: No   Dilation: 8-9        Effacement (%): 100  Station: 0  Baseline Rate: 170 bpm  FHR Category: Category II     Oxytocin Safety Progress Check: Safety check completed    Notes/comments:   Patient making minimal change with inadequate contractions of MVUs approximately 100-130  FHT is cat II secondary to fetal tachycardia in 170s-180s  Maternal tachycardia noted at 90-100s  Unasyn 3g IV Q6 hours started for suspicion of chorioamnionitis  Patient counseled that if she continues to make no cervical change that delivery may be via  section  She states understanding      To be d/w Dr Mark Rivera MD 2018 2:03 PM

## 2018-06-04 NOTE — ANESTHESIA POSTPROCEDURE EVALUATION
Post-Op Assessment Note      CV Status:  Stable    Mental Status:  Awake    Hydration Status:  Stable and euvolemic    PONV Controlled:  None    Airway Patency:  Patent    Post Op Vitals Reviewed: Yes          Staff: Anesthesiologist     Post-op block assessment: no complications and catheter intact        /54 (06/04/18 1713)    Temp      Pulse 93 (06/04/18 1713)   Resp 16 (06/04/18 1713)    SpO2 97 % (06/04/18 1713)

## 2018-06-04 NOTE — DISCHARGE INSTRUCTIONS
Section   WHAT YOU SHOULD KNOW:   A  delivery, or , is abdominal surgery to deliver your baby  There are many reasons you may need a   · A  may be scheduled before labor if you had a  with your last baby  It may be scheduled if your baby is not positioned normally, or you are pregnant with more than 1 baby  · Your caregiver may perform an emergency  during labor to prevent life-threatening complications for you or your baby  A  may be done if your cervix does not dilate after several hours of active labor  · Other reasons for a  include maternal infections and problems with the placenta  AFTER YOU LEAVE:   Medicines:   · Prescription pain medicine  may be given  Ask how to take this medicine safely  · Acetaminophen  decreases pain and fever  It is available without a doctor's order  Ask how much to take and how often to take it  Follow directions  Acetaminophen can cause liver damage if not taken correctly  · NSAIDs  help decrease swelling and pain or fever  This medicine is available with or without a doctor's order  NSAIDs can cause stomach bleeding or kidney problems in certain people  If you take blood thinner medicine, always ask your obstetrician if NSAIDs are safe for you  Always read the medicine label and follow directions  · Take your medicine as directed  Contact your obstetrician (OB) if you think your medicine is not helping or if you have side effects  Tell him if you are allergic to any medicine  Keep a list of the medicines, vitamins, and herbs you take  Include the amounts, and when and why you take them  Bring the list or the pill bottles to follow-up visits  Carry your medicine list with you in case of an emergency  Follow up with your OB as directed: You may need to return to have your stitches or staples removed   Write down your questions so you remember to ask them during your visits  Wound care:  Carefully wash your wound with soap and water every day  Keep your wound clean and dry  Wear loose, comfortable clothes that do not rub against your wound  Ask your OB about bathing and showering  Drink plenty of liquids: You can lower your risk for a blood clot if you drink plenty of liquids  Ask how much liquid to drink each day and which liquids are best for you  Limit activity until you have fully recovered from surgery:   · Ask when it is safe for you to drive, walk up stairs, lift heavy objects, and have sex  · Ask when it is okay to exercise, and what types of exercise to do  Start slowly and do more as you get stronger  Contact your OB if:   · You have heavy vaginal bleeding that fills 1 or more sanitary pads in 1 hour  · You have a fever  · Your incision is swollen, red, or draining pus  · You have questions or concerns about yourself or your baby  Seek care immediately or call 911 if:   · Blood soaks through your bandage  · Your stitches come apart  · You feel lightheaded, short of breath, and have chest pain  · You cough up blood  · Your arm or leg feels warm, tender, and painful  It may look swollen and red  © 2014 [address] is for End User's use only and may not be sold, redistributed or otherwise used for commercial purposes  All illustrations and images included in CareNotes® are the copyrighted property of A D A M [employer] Inc  or Israel Dhaliwal  The above information is an  only  It is not intended as medical advice for individual conditions or treatments  Talk to your doctor, nurse or pharmacist before following any medical regimen to see if it is safe and effective for you

## 2018-06-04 NOTE — OB LABOR/OXYTOCIN SAFETY PROGRESS
Labor Progress Note - Robina Hurtado 28 y o  female MRN: [id number]    Unit/Bed#: -01 Encounter: [id number]    Obstetric History       T0      L0     SAB0   TAB0   Ectopic0   Multiple0   Live Births0      Gestational Age: 37w0d  Dose (tasia-units/min) Oxytocin: 10 tasia-units/min  Contraction Frequency (minutes): 2-3  Contraction Quality: Moderate  Tachysystole: No   Dilation: 8-9        Effacement (%): 100  Station: 0  Baseline Rate: 145 bpm  FHR Category: Category II     Oxytocin Safety Progress Check: Safety check completed    Notes/comments:   Called to bedside for spontaneous prolonged deceleration at 1136 was noted by nurse when patient was turned to the left maternal side down to the 60s lasting for approximately 6 minutes  Upon further review of her tracing that was noted to be category 2- intermittent late decelerations of variable decelerations were noted, however, moderate variability with positive 15 x 15 accelerations were also noted  Patient was checked was noted to be 8-9/90/0 and caput was noted  Patient was turned to maternal right side, Pitocin was discontinued, fluid bolus was administered and oxygen 10 L via face mask was administered  Baseline was noted to return with moderate variability  Plan moving forward will be placing IUPC, keep to sent off for 30 minutes and restart at 2 milliunits/min and titrate  Will continue to closely monitor  Discussed with Dr Noe Ames and charge nurse and bedside nurse      Ashish Sandra MD 2018 11:56 AM

## 2018-06-04 NOTE — OB LABOR/OXYTOCIN SAFETY PROGRESS
Oxytocin Safety Progress Check Note - Cesar Esquivel 28 y o  female MRN: [id number]    Unit/Bed#: -01 Encounter: [id number]    Obstetric History       T0      L0     SAB0   TAB0   Ectopic0   Multiple0   Live Births0      Gestational Age: 37w0d  Dose (tasia-units/min) Oxytocin: 10 tasia-units/min  Contraction Frequency (minutes): 1-2  Contraction Quality: Moderate  Tachysystole: No   Dilation: 8        Effacement (%): 100  Station: 0  Baseline Rate: 120 bpm  Fetal Heart Rate: 120 BPM  FHR Category: Category II     Oxytocin Safety Progress Check: Safety check completed    Notes/comments:   Patient feeling pain in her back and abdomen with contractions    Exam unchanged from approximately 1 hour ago  Will call anesthesia to bolus her epidural           Mendez Mathews MD 2018 10:47 AM

## 2018-06-04 NOTE — DISCHARGE SUMMARY
Discharge Summary - Lauren Mueller 28 y o  female MRN: [id number]    Unit/Bed#: -01 Encounter: [id number]    Admission Date: 2018     Discharge Date: 2018    Delivering Attending: Leo Cerna MD    Admitting Diagnosis:   1  Pregnancy at 40w0d  2  Single IUP  3  Rubella nonimmune    Discharge Diagnosis:   Same, delivered  Chorioamnionitis    Procedures:  Primary low transverse Caesarean section for failure to dilate with category 2 tracing    Complications: none apparent    Hospital Course:     Lauren Mueller is a 28 y o  G 1 P0 at 40w0d wks who was initially admitted for spontaneous rupture of membranes on 2018 at 1800  Patient had relatively uncomplicated pregnancy course  Patient made slow progress and was noted to have failure of dilatation in the setting of a category 2 fetal heart tracing where fetal tachycardia was noted  Fetal tachycardia with maternal tachycardia gave patient diagnosis of chorioamnionitis for which she received treatment and was communicated with nursing and NICU as well as patient  She delivered a viable female  on 18 at [phone]  Weight 7lbs 10oz via primary  section, low transverse incision  Apgars were 9 (1 min) and 9 (5 min)   was transferred to  nursery  Patient tolerated the procedure well and was transferred to recovery in stable condition  Condition at discharge: good     On day of discharge pain was well controlled, patient was tolerating PO, passing flatus, a bowel movement  Preoperative hemoglobin was 14 6 and postoperative hemoglobin was 10 3  She was discharged on postop day #3 with standard post partum instructions to follow up with her physician in 3-6 weeks for a postpartum appointment and to call with any signs of infection or bleeding  Discharge instructions/Information to patient and family:   See after visit summary for information provided to patient and family        Provisions for Follow-Up Care:  See after visit summary for information related to follow-up care and any pertinent home health orders  Disposition: See After Visit Summary for discharge disposition information  Planned Readmission: No    Discharge Medications: For a complete list of the patient's medications, please refer to her med rec        Patricia Douglas MD

## 2018-06-04 NOTE — OB LABOR/OXYTOCIN SAFETY PROGRESS
Labor Progress Note - Brett Cho 28 y o  female MRN: [id number]    Unit/Bed#: -01 Encounter: [id number]    Obstetric History       T0      L0     SAB0   TAB0   Ectopic0   Multiple0   Live Births0      Gestational Age: 37w0d     Contraction Frequency (minutes): 2-4  Contraction Quality: Mild  Tachysystole: No   Dilation: 5-6        Effacement (%): 90  Station: -1  Baseline Rate: 140 bpm  Fetal Heart Rate: 150 BPM  FHR Category: Category I          Notes/comments:   Patient comfortable with epidural  Small cervical change from prior, now 5-6/90/-1  Category I FHT, karla every 2-4 minutes  Continue expectant management of labor, if minimal/no change at next check consider Pitocin for augmentation    Discussed w/ Dr Ash Monson MD 2018 4:07 AM

## 2018-06-04 NOTE — OB LABOR/OXYTOCIN SAFETY PROGRESS
Oxytocin Safety Progress Check Note - Brett Cho 28 y o  female MRN: [id number]    Unit/Bed#: -01 Encounter: [id number]    Obstetric History       T0      L0     SAB0   TAB0   Ectopic0   Multiple0   Live Births0      Gestational Age: 37w0d  Dose (tasia-units/min) Oxytocin: 2 tasia-units/min  Contraction Frequency (minutes): 2-4  Contraction Quality: Mild  Tachysystole: No   Dilation: 7-8        Effacement (%): 90  Station: 0  Baseline Rate: 150 bpm  Fetal Heart Rate: 150 BPM  FHR Category: Category I     Oxytocin Safety Progress Check: Safety check completed    Notes/comments:   Patient comfortable with epidural  Making good cervical change as checked by Dr Yogi Cordero she is now   Continue pitocin titration and will check in 1 hour      Micaela Chandler MD 2018 7:59 AM

## 2018-06-04 NOTE — ANESTHESIA PROCEDURE NOTES
CSE Block    Patient location during procedure: OB  Start time: 6/4/2018 3:09 AM  Reason for block: procedure for pain and at surgeon's request  Staffing  Anesthesiologist: Nilsa Adams  Performed: anesthesiologist   Preanesthetic Checklist  Completed: patient identified, site marked, surgical consent, pre-op evaluation, timeout performed, IV checked, risks and benefits discussed and monitors and equipment checked  CSE  Patient position: sitting  Prep: Betadine  Patient monitoring: heart rate, cardiac monitor, continuous pulse ox and frequent blood pressure checks  Approach: midline  Spinal Needle  Needle type: pencil-tip   Needle gauge: 27 G  Needle length: 10 cm  Epidural Needle  Injection technique: BLANCA saline  Needle type: Tuohy   Needle gauge: 18 G  Needle insertion depth: 6 cm  Location: lumbar (1-5)  Catheter  Catheter type: end hole  Catheter size: 18 G  Catheter at skin depth: 12 cm  Test dose: negative  Assessment  Sensory level: T4  Injection Assessment:  negative aspiration for heme, no pain on injection, no paresthesia on injection and positive aspiration for clear CSF

## 2018-06-04 NOTE — ANESTHESIA PREPROCEDURE EVALUATION
Physical Exam    Airway    Mallampati score: II         Dental   No notable dental hx     Cardiovascular      Pulmonary      Other Findings        Anesthesia Plan  ASA Score- 2     Anesthesia Type- epidural with ASA Monitors  Additional Monitors:   Airway Plan:     Comment: I, Dr Uzma Lozada, the attending physician, have personally seen and evaluated the patient prior to anesthetic care  I have reviewed the pre-anesthetic record, and other medical records if appropriate to the anesthetic care  If a CRNA is involved in the case, I have reviewed the CRNA assessment, if present, and agree  The patient is in a suitable condition to proceed with my formulated anesthetic plan        Plan Factors-    Induction-     Postoperative Plan-     Informed Consent- Anesthetic plan and risks discussed with patient  I personally reviewed this patient with the CRNA  Discussed and agreed on the Anesthesia Plan with the CRNA  Manpreet Pollock

## 2018-06-04 NOTE — OB LABOR/OXYTOCIN SAFETY PROGRESS
Oxytocin Safety Progress Check Note - Katie Sierra 28 y o  female MRN: [id number]    Unit/Bed#: -01 Encounter: [id number]    Obstetric History       T0      L0     SAB0   TAB0   Ectopic0   Multiple0   Live Births0      Gestational Age: 37w0d  Dose (tasia-units/min) Oxytocin: 4 tasia-units/min  Contraction Frequency (minutes):  5-1 5  Contraction Quality: Moderate  Tachysystole: No   Dilation: 8-9        Effacement (%): 100  Station: 0  Baseline Rate: 170 bpm  FHR Category: Category II     Oxytocin Safety Progress Check: Safety check completed    Notes/comments:   Patient making no cervical change  Fetal tachycardia remains and 170s with intermittent variable decelerations  Patient remains afebrile  Discussed findings with patient- discussed at this point being that she has made no cervical change and fetal heart tracing has remained category 2 it is recommended that she proceed with  section  Discussed risks, benefits, and alternatives of  section with patient  She reports understanding at this time and agrees to proceed with  section  Will order Ancef 2 g once and Metronidazole 500 mg once IV for prophylaxis    NPO and SCDs    D/w Dr Pippa Gomez who is en route          Mendy Hazel MD 2018 3:07 PM

## 2018-06-04 NOTE — OB LABOR/OXYTOCIN SAFETY PROGRESS
Oxytocin Safety Progress Check Note - Vidal Lazaro 28 y o  female MRN: [id number]    Unit/Bed#: -01 Encounter: [id number]    Obstetric History       T0      L0     SAB0   TAB0   Ectopic0   Multiple0   Live Births0      Gestational Age: 37w0d  Dose (tasia-units/min) Oxytocin: 8 tasia-units/min  Contraction Frequency (minutes): 1-3  Contraction Quality: Moderate  Tachysystole: No   Dilation: 8        Effacement (%): 100  Station: 0  Baseline Rate: 155 bpm  Fetal Heart Rate: 159 BPM  FHR Category: Category I     Oxytocin Safety Progress Check: Safety check completed    Notes/comments:   Patient is comfortable with epidural    Minimal cervical change- will continue to monitor    FHT I    To be discussed w/ Dr Ju Ortiz MD 2018 9:52 AM

## 2018-06-04 NOTE — OP NOTE
OPERATIVE REPORT  PATIENT NAME: Nacho Herrera    :  1990  MRN: [id number]  Pt Location: [facility] OR ROOM 02    SURGERY DATE: 2018    Surgeon(s) and Role:     * Kim Claire MD - Primary     * Elizabet Head MD - Assisting    Preop Diagnosis:  Failure to progress in labor [O62 2]    Post-Op Diagnosis Codes:     * Failure to progress in labor [O62 2]    Procedure(s) (LRB):   SECTION () (N/A)    Specimen(s):  ID Type Source Tests Collected by Time Destination   1 :  Tissue (Placenta on Hold) OB Only Placenta TISSUE EXAM OB (PLACENTA) ONLY Kim Claire MD 2018 1623    A :  Cord Blood Cord BLOOD GAS, VENOUS, CORD, BLOOD GAS, ARTERIAL, CORD Kim Claire MD 2018 1620        Estimated Blood Loss:   Minimal    Drains:  Urethral Catheter Latex;Straight-tip 16 Fr  (Active)   Site Assessment Clean;Skin intact 2018  7:08 PM   Collection Container Standard drainage bag 2018  7:08 PM   Securement Method Securing device (Describe) 2018  7:08 PM   Output (mL) 300 mL 2018  6:15 PM   Number of days: 0       Anesthesia Type:   Epidural    Operative Indications:  Failure to progress in labor [O62 2]  Failure of dilation  Cat II FHT    Operative Findings:  1  Viable female  delivered on 2018 at 65, weight 7 lb 10 oz, APGARS 9 at 1 minute and 9 at 5 minutes  2  Thin meconium fluid  3  Intact placenta with a centrally inserted three-vessel cord delivered at 16 11  4  Arterial cord gas pH 7 342, BE -2 9; venous cord gas pH 7 374, BE -3 6  5  Grossly normal-appearing uterus, fallopian tubes and bilateral ovaries    Complications:   Chorioamnionitis    Procedure and Technique:  The patient was taken to the operating room where a time out was performed to confirm correct patient and correct procedure    Epidural anesthesia was adequately established an 2gm Ancef and 500mg Metronidazole IV were given for preoperative prophylaxis in the setting of chorioamnionitis  The patient was then placed in a supine position with a left lateral tilt  Pabon catheter was already established  Fetal heart tones were noted to be normal  The patient was then prepped and draped in the usual sterile fashion for a Pfannenstiel skin incision  An incision was made in the skin with a surgical scalpel  Sharp and blunt dissection was used to reach the level of the rectus fascia  Bovie electrocautery was utilitzed for hemostasis during this process  The fascia was incised transversely at the midline and the fascial incision was extended bilaterally using sharp dissection with Adamson scissors  The superior edge of the fascial incision was grasped with Kocher clamps, tented up and the underlying rectus muscles were dissected off bluntly and sharply using the scapel  The inferior edge of the fascial incision was then dissection off the rectus muscles with blunt and sharp dissection  The rectus muscles were then divided at the midline  The peritoneum was identified, tented up at its upper margin taking care to avoid the bladder, and then entered with blunt dissection  The peritoneal incision and rectus muscle were extended bilaterally bluntly with gentle traction  The bladder blade was inserted  A bladder flap was created using Metzenbaum scissors  Then, a transverse incision was made in the lower uterine segment using a new surgical blade  The uterine incision was extended cephalad and caudal using blunt dissection  The amniotic sac was entered bluntly and the amniotic fluid was noted to be thin meconium  The surgeon's hand was placed into the uterine cavity  The fetus was noted to be in cephalic presentation occiput posterior position, and the presenting part was grasped and delivered through the uterine incision with the assistance of fundal pressure  No nuchal cord was identified  The infant's oral and nasal passages were bulb suctioned    After delivery the cord was doubly clamped and cut  The infant was then passed off the table to the awaiting  staff  Venous and arterial blood gas, cord blood, and portion of cord was obtained for analysis and routine blood testing  The placenta was then delivered  The placenta was noted to be intact with a centrally inserted three-vessel cord  Oxytocin was administered by IV infusion to enhance uterine contraction  The uterus was exteriorized and cleared of all clots and debris by blunt curretage with a moist lap sponge  The uterine incision was reapproximated using a 0 Vicryl in a running locked fashion  A second vertical imbricating stitch with 0 Vicryl was applied  The uterine incision was examined and noted to be hemostatic  The posterior cul-de-sac was irrigated and suctioned and cleared of all clots  The uterus was replaced into the abdomen and the pericolic gutters were cleared of all clots with sloppy wet lap  The uterine incision was once again reexamined and noted to be hemostatic  The fascia was re approximated using 0 Vicryl in a running nonlocked fashion  The subcutaneous tissue was irrigated and cleared of all clots and debris  Good hemostasis was achieved with Bovie electrocautery  The subcutaneous  tissue was reapproximated with 2-0 Plain suture with 3 interrupted sutures  The skin incision was closed with 4-0 monocryl  Good hemostasis was noted  Histoacryl and ABD pad were placed sterilely placed on top of the skin incision as a surgical dressing  All needle, sponge, and instrument counts were noted to be correct x 2 at the end of the procedure  The patient was then cleansed and transferred to the recovery room  Overall, the patient tolerated the procedure well and is currently in stable condition in the PACU with her   Patient was ordered an additional dose of Ancef 2 g and metronidazole 500 mg IV following 8 hours from the initial dose      Dr Juan Bangura was scrubbed and was present for the entire procedure          Patient Disposition:  PACU     SIGNATURE: Lien Baez MD  DATE: June 4, 2018  TIME: 7:37 PM

## 2018-06-04 NOTE — H&P
H & P- Obstetrics   Bobby Guillen 28 y o  female MRN: [id number]  Unit/Bed#: LD Triage  Encounter: [id number]    Assessment: 28 y o  Avelino Gary at 40w0d admitted for spontaneous rupture of membranes and labor  SVE /-1, ruptured; Category I FHT  AB + / GBS negative  VTX on TAUS, Clinical EFW 3600g      Plan:   · Admit  · CBC, RPR, Type & Screen  · Analgesia at maternal request  · Expectant management    Dr Fanny Garcia aware      Chief Complaint: contractions, leakage of fluid    HPI: Bobby Guillen is a 28 y o  Avelino Do with an RAFFY of 2018, by Ultrasound at 40w0d who is being admitted for spontaneous rupture of membranes and labor  The patient reports leakage of clear fluid and worsening contractions since 6/3/18 @ 1800  She denies vaginal bleeding  She reports good fetal movement  OB History unremarkable except for Rubella nonimmune      Patient Active Problem List   Diagnosis    40 weeks gestation of pregnancy    Uterine contractions during pregnancy    Amniotic fluid leaking    Rubella non-immune status, antepartum       Baby complications/comments: none    Review of Systems   Constitutional: Negative for chills and fever  Respiratory: Negative for shortness of breath  Cardiovascular: Negative for chest pain  Gastrointestinal: Negative for diarrhea, nausea and vomiting  Neurological: Positive for dizziness  Negative for headaches          With contractions          OB History    Para Term  AB Living   1 0 0 0 0 0   SAB TAB Ectopic Multiple Live Births   0 0 0 0 0      # Outcome Date GA Lbr Ash/2nd Weight Sex Delivery Anes PTL Lv   1 Current                   Past Medical History:   Diagnosis Date    HPV in female     Seasonal allergies     Urinary tract infection     Varicella     childhood       Past Surgical History:   Procedure Laterality Date    COLONOSCOPY W/ BIOPSIES      WISDOM TOOTH EXTRACTION         Social History   Substance Use Topics    Smoking status: Former Smoker    Smokeless tobacco: Never Used    Alcohol use 1 8 oz/week     3 Standard drinks or equivalent per week      Comment: no alcohol since pregnant       Allergies   Allergen Reactions    Nitrofurantoin Anaphylaxis       Prescriptions Prior to Admission   Medication    Prenatal MV-Min-Fe Cbn-FA-DHA (PRENATAL PLUS DHA) 7-0 4-100 MG TABS       Objective:  Temp:  [97 3 °F (36 3 °C)-97 9 °F (36 6 °C)] 97 9 °F (36 6 °C)  HR:  [70-89] 89  Resp:  [18-24] 18  BP: (111-126)/(54-69) 126/69  Body mass index is 32 92 kg/m²       Physical Exam:  Gen: NAD, AAOx3, Pleasant & Cooperative  Cv: RRR, No M/R/G  Resp: CTAB, No W/R/R  Abdomen: Gravid, nontender  Ext: Symmetric, non-tender    TAUS: VTX  Clinical EFW: 3600g    SSE: + pooling, nitrazine & ferning    SVE:  Dilation: 5  Effacement (%): 90  Station: -1  Cervical Characteristics: Mid-position, Soft    FHT:  Baseline Rate: 130 bpm  Variability: Moderate 6-25 bpm  Accelerations: 15 x 15 or greater  Decelerations: None  FHR Category: Category I    TOCO:   Contraction Frequency (minutes): 2-3  Contraction Quality: Moderate    Lab Results   Component Value Date    WBC NONE SEEN 10/12/2017    WBC NONE SEEN 10/12/2017    WBC NONE SEEN 10/12/2017    WBC 13 1 (H) 10/12/2017    HGB 12 1 03/09/2018    HCT 35 9 03/09/2018     03/09/2018     No results found for: NA, K, CL, CO2, BUN, CREATININE, GLUCOSE, AST, ALT  Prenatal Labs: reviewed     Blood type: AB +   Antibody: Negative  GBS: Negative  HIV:Nonreactive  Rubella: Nonimmune  VDRL/RPR: Non reactive  HBsAg: Negative  Chlamydia: Negative  Gonorrhea: Negative  Diabetes 1 hour screen: 102  Admission CBC pending  >2 Midnights  INPATIENT     Signature/Title: Giselle Vazquez MD  Date: 6/4/2018  Time: 2:13 AM

## 2018-06-05 LAB
ANISOCYTOSIS BLD QL SMEAR: PRESENT
BASOPHILS # BLD MANUAL: 0 THOUSAND/UL (ref 0–0.1)
BASOPHILS NFR MAR MANUAL: 0 % (ref 0–1)
EOSINOPHIL # BLD MANUAL: 0.29 THOUSAND/UL (ref 0–0.4)
EOSINOPHIL NFR BLD MANUAL: 1 % (ref 0–6)
ERYTHROCYTE [DISTWIDTH] IN BLOOD BY AUTOMATED COUNT: 13.2 % (ref 11.6–15.1)
HCT VFR BLD AUTO: 31.3 % (ref 34.8–46.1)
HGB BLD-MCNC: 10.3 G/DL (ref 11.5–15.4)
LYMPHOCYTES # BLD AUTO: 11 % (ref 14–44)
LYMPHOCYTES # BLD AUTO: 3.16 THOUSAND/UL (ref 0.6–4.47)
MCH RBC QN AUTO: 31.9 PG (ref 26.8–34.3)
MCHC RBC AUTO-ENTMCNC: 32.9 G/DL (ref 31.4–37.4)
MCV RBC AUTO: 97 FL (ref 82–98)
METAMYELOCYTES NFR BLD MANUAL: 3 % (ref 0–1)
MONOCYTES # BLD AUTO: 1.15 THOUSAND/UL (ref 0–1.22)
MONOCYTES NFR BLD: 4 % (ref 4–12)
NEUTROPHILS # BLD MANUAL: 23.3 THOUSAND/UL (ref 1.85–7.62)
NEUTS BAND NFR BLD MANUAL: 13 % (ref 0–8)
NEUTS SEG NFR BLD AUTO: 68 % (ref 43–75)
NRBC BLD AUTO-RTO: 0 /100 WBCS
PLATELET # BLD AUTO: 241 THOUSANDS/UL (ref 149–390)
PLATELET BLD QL SMEAR: ADEQUATE
PMV BLD AUTO: 10 FL (ref 8.9–12.7)
RBC # BLD AUTO: 3.23 MILLION/UL (ref 3.81–5.12)
RBC MORPH BLD: PRESENT
RPR SER QL: NORMAL
WBC # BLD AUTO: 28.77 THOUSAND/UL (ref 4.31–10.16)

## 2018-06-05 PROCEDURE — 85027 COMPLETE CBC AUTOMATED: CPT | Performed by: STUDENT IN AN ORGANIZED HEALTH CARE EDUCATION/TRAINING PROGRAM

## 2018-06-05 PROCEDURE — 94762 N-INVAS EAR/PLS OXIMTRY CONT: CPT

## 2018-06-05 PROCEDURE — 85007 BL SMEAR W/DIFF WBC COUNT: CPT | Performed by: STUDENT IN AN ORGANIZED HEALTH CARE EDUCATION/TRAINING PROGRAM

## 2018-06-05 RX ADMIN — KETOROLAC TROMETHAMINE 30 MG: 30 INJECTION, SOLUTION INTRAMUSCULAR at 11:30

## 2018-06-05 RX ADMIN — NALBUPHINE HYDROCHLORIDE 5 MG: 10 INJECTION, SOLUTION INTRAMUSCULAR; INTRAVENOUS; SUBCUTANEOUS at 13:23

## 2018-06-05 RX ADMIN — CEFAZOLIN SODIUM 2000 MG: 10 INJECTION, POWDER, FOR SOLUTION INTRAVENOUS at 00:11

## 2018-06-05 RX ADMIN — DOCUSATE SODIUM 100 MG: 100 CAPSULE, LIQUID FILLED ORAL at 20:58

## 2018-06-05 RX ADMIN — NALBUPHINE HYDROCHLORIDE 5 MG: 10 INJECTION, SOLUTION INTRAMUSCULAR; INTRAVENOUS; SUBCUTANEOUS at 03:07

## 2018-06-05 RX ADMIN — KETOROLAC TROMETHAMINE 30 MG: 30 INJECTION, SOLUTION INTRAMUSCULAR at 05:00

## 2018-06-05 RX ADMIN — SIMETHICONE CHEW TAB 80 MG 80 MG: 80 TABLET ORAL at 22:38

## 2018-06-05 RX ADMIN — SODIUM CHLORIDE, SODIUM LACTATE, POTASSIUM CHLORIDE, AND CALCIUM CHLORIDE 125 ML/HR: .6; .31; .03; .02 INJECTION, SOLUTION INTRAVENOUS at 03:07

## 2018-06-05 RX ADMIN — DOCUSATE SODIUM 100 MG: 100 CAPSULE, LIQUID FILLED ORAL at 09:00

## 2018-06-05 RX ADMIN — METRONIDAZOLE 500 MG: 500 INJECTION, SOLUTION INTRAVENOUS at 01:13

## 2018-06-05 RX ADMIN — IBUPROFEN 600 MG: 600 TABLET ORAL at 20:59

## 2018-06-05 NOTE — PROGRESS NOTES
Progress Note - OB/GYN   Marija Safe Homero 28 y o  female MRN: [id number]  Unit/Bed#: -01 Encounter: [id number]    Assessment:  28 y o   s/p Primary low transverse  section for failure of dilation and persisting category 2 tracing post-operative day 1  Chorioamnionitis status post Unasyn x1 intrapartum, Ancef and metronidazole prior to and 1 dose after delivery  H 6 ->10 3  Urine output:  160 cc/hour  Rubella non immune  Patient recovering well, Stable    Plan:  Continue routine post partum care  Pain management PRN  Encourage ambulation  Encourage incentive spirometry  Encourage breastfeeding    Subjective/Objective   Chief Complaint:    Postpartum state    Subjective:   Patient seen in room with baby and FOB at bedside  She reports she is feeling well this morning without complaint  Denies any symptoms of fevers, chills or severe abdominal pain  Pain:  Denies  Tolerating PO: yes  Voiding:   Pabon just removed voiding trial to follow  Flatus:  No  BM: no  Ambulating:  No- encouraged ambulation  Breastfeeding:  yes  Chest pain: no  Shortness of breath: no  Leg pain: no  Lochia: minimal    Objective:     Vitals: Temp:  [97 9 °F (36 6 °C)-100 °F (37 8 °C)] 98 6 °F (37 °C)  HR:  [] 75  Resp:  [16-18] 16  BP: ()/(45-82) 85/51       Intake/Output Summary (Last 24 hours) at 18 1539  Last data filed at 18 0434   Gross per 24 hour   Intake          2314 58 ml   Output             2600 ml   Net          -285 42 ml         Physical Exam:   General: NAD, alert, oriented  Cardio: Regular rate and rhythm, no murmur  Resp: nonlabored breathing, clear to auscultation bilaterally  Abdomen: Soft, no distension/rebound/guarding, appropriate tenderness   Fundus: Firm, non-tender, fundus:  1 cm below umbilicus  Incision:  Running absorbable suture with overlying histoacryl clean/dry/intact, no erythema or discharge  G/U:  Minimal lochia noted on pad  Lower Extremities: Non-tender, no palpable cords, SCDs in place    Medications:  Current Facility-Administered Medications   Medication Dose Route Frequency    acetaminophen (TYLENOL) tablet 650 mg  650 mg Oral Q6H PRN    aluminum-magnesium hydroxide-simethicone (MYLANTA) 200-200-20 mg/5 mL oral suspension 15 mL  15 mL Oral Q6H PRN    calcium carbonate (TUMS) chewable tablet 1,000 mg  1,000 mg Oral Daily PRN    dexamethasone (DECADRON) injection 8 mg  8 mg Intravenous Once PRN    diphenhydrAMINE (BENADRYL) injection 25 mg  25 mg Intravenous Q6H PRN    docusate sodium (COLACE) capsule 100 mg  100 mg Oral BID    ibuprofen (MOTRIN) tablet 600 mg  600 mg Oral Q6H PRN    ketorolac (TORADOL) injection 30 mg  30 mg Intravenous Q6H    lactated ringers infusion  125 mL/hr Intravenous Continuous    metoclopramide (REGLAN) injection 5 mg  5 mg Intravenous Q6H PRN    nalbuphine (NUBAIN) injection 5 mg  5 mg Intravenous Q3H PRN    nalbuphine (NUBAIN) injection 5 mg  5 mg Intravenous Q3H PRN    naloxone (NARCAN) injection 0 1 mg  0 1 mg Intravenous Q3 min PRN    ondansetron (ZOFRAN) injection 4 mg  4 mg Intravenous Q4H PRN    ondansetron (ZOFRAN) injection 4 mg  4 mg Intravenous Q8H PRN    oxyCODONE-acetaminophen (PERCOCET) 5-325 mg per tablet 1 tablet  1 tablet Oral Q4H PRN    oxyCODONE-acetaminophen (PERCOCET) 5-325 mg per tablet 2 tablet  2 tablet Oral Q4H PRN    senna (SENOKOT) tablet 8 6 mg  1 tablet Oral Daily    simethicone (MYLICON) chewable tablet 80 mg  80 mg Oral 4x Daily PRN       Labs:   Recent Results (from the past 24 hour(s))   Blood gas, venous, cord    Collection Time: 06/04/18  4:20 PM   Result Value Ref Range    pH, Cord Enrrique 7 374 7 190 - 7 490    pCO2, Cord Enrrique 36 7 27 0 - 43 0 mm HG    pO2, Cord Enrrique 20 7 15 0 - 45 0 mm HG    HCO3, Cord Enrrique 20 9 12 2 - 28 6 mmol/L    Base Exc, Cord Enrrique -3 6 (L) 1 0 - 9 0 mmol/L    O2 Cont, Cord Enrrique 11 1 mL/dL    O2 HGB,VENOUS CORD 51 0 %   Blood gas, arterial, cord    Collection Time: 06/04/18  4:20 PM   Result Value Ref Range    pH, Cord Art 7 342 7 230 - 7 430    pCO2, Cord Art 43 1 30 0 - 60 0    pO2, Cord Art 14 5 5 0 - 25 0 mm HG    HCO3, Cord Art 22 8 17 3 - 27 3 mmol/L    Base Exc, Cord Art -2 9 (L) 3 0 - 11 0 mmol/L    O2 Content, Cord Art 6 4 ml/dl    O2 Hgb, Arterial Cord 29 5 %   CBC and differential    Collection Time: 06/05/18  4:42 AM   Result Value Ref Range    WBC 28 77 (H) 4 31 - 10 16 Thousand/uL    RBC 3 23 (L) 3 81 - 5 12 Million/uL    Hemoglobin 10 3 (L) 11 5 - 15 4 g/dL    Hematocrit 31 3 (L) 34 8 - 46 1 %    MCV 97 82 - 98 fL    MCH 31 9 26 8 - 34 3 pg    MCHC 32 9 31 4 - 37 4 g/dL    RDW 13 2 11 6 - 15 1 %    MPV 10 0 8 9 - 12 7 fL    Platelets 343 684 - 340 Thousands/uL    nRBC 0 /100 WBCs         Madeline Araujo  6/5/2018  6:49 AM

## 2018-06-05 NOTE — PLAN OF CARE
Knowledge Deficit     Verbalizes understanding of labor plan Completed     Patient/family/caregiver demonstrates understanding of disease process, treatment plan, medications, and discharge instructions Completed          ALTERATION IN THE BREAST     Optimize infant feeding at the breast [address] Discharge to home or other facility with appropriate resources Progressing        INADEQUATE LATCH, SUCK OR SWALLOW     Demonstrate ability to latch and sustain latch, audible swallowing and satiety Progressing        INFECTION - ADULT     Absence or prevention of progression during hospitalization Progressing     Absence of fever/infection during neutropenic period Progressing        PAIN - ADULT     Verbalizes/displays adequate comfort level or baseline comfort level Progressing        POSTPARTUM     Experiences normal postpartum course Progressing     Appropriate maternal -  bonding Progressing     Establishment of infant feeding pattern Progressing     Incision(s), wounds(s) or drain site(s) healing without S/S of infection Progressing        SAFETY ADULT     Patient will remain free of falls Progressing     Maintain or return to baseline ADL function Progressing     Maintain or return mobility status to optimal level Progressing

## 2018-06-05 NOTE — PROGRESS NOTES
Made aware of hypotension, BP 89/40 while patient in rest sleeping  HR 74  Patient seen and evaluated at bedside, she is asymptomatic without complaints  She denies chest pain, shortness of breath or dizziness  Uterus firm at the umbilicus, lochia WNL  Urine output adequate  Likely 2/2 resting state  Continue to monitor

## 2018-06-06 RX ADMIN — SENNOSIDES 8.6 MG: 8.6 TABLET ORAL at 09:41

## 2018-06-06 RX ADMIN — DOCUSATE SODIUM 100 MG: 100 CAPSULE, LIQUID FILLED ORAL at 17:07

## 2018-06-06 RX ADMIN — IBUPROFEN 600 MG: 600 TABLET ORAL at 11:31

## 2018-06-06 RX ADMIN — IBUPROFEN 600 MG: 600 TABLET ORAL at 23:22

## 2018-06-06 RX ADMIN — IBUPROFEN 600 MG: 600 TABLET ORAL at 05:03

## 2018-06-06 RX ADMIN — DOCUSATE SODIUM 100 MG: 100 CAPSULE, LIQUID FILLED ORAL at 09:41

## 2018-06-06 RX ADMIN — SIMETHICONE CHEW TAB 80 MG 80 MG: 80 TABLET ORAL at 07:27

## 2018-06-06 NOTE — PROGRESS NOTES
Progress Note - OB/GYN   Choco Valentin 28 y o  female MRN: [id number]  Unit/Bed#: -01 Encounter: [id number]    Assessment:  27 yo  Post Op Day #2 s/p 1LTCS for failture of dilation and persistent category 2 tracing, stable    Plan:  1  Continue routine post partum care   -Encourage ambulation   -Encourage breastfeeding  2  Chorioamnionitis   - Unasyn x1 intrapartum, Ancef and metronidazole prior to and 1 dose after delivery   -afebrile  3  Rubella nonimmune   -MMR ordered  4  Anticipate discharge tomorrow    Subjective/Objective   Chief Complaint:     Post delivery    Subjective:     Pain: yes, cramping, improved with meds  Tolerating PO: yes  Voiding: yes  Flatus: yes  BM: no  Ambulating: yes  Breastfeeding:  Yes  Chest pain: no  Shortness of breath: no  Leg pain: no  Lochia: minimal    Objective:     Vitals: /62   Pulse 79   Temp 98 2 °F (36 8 °C) (Oral)   Resp 18   Ht 5' 2" (1 575 m)   Wt 81 6 kg (180 lb)   LMP 2017 (Exact Date)   SpO2 98%   Breastfeeding?  Yes   BMI 32 92 kg/m²       Intake/Output Summary (Last 24 hours) at 18 0555  Last data filed at 18 1656   Gross per 24 hour   Intake                0 ml   Output             1100 ml   Net            -1100 ml       Lab Results   Component Value Date    WBC 28 77 (H) 2018    HGB 10 3 (L) 2018    HCT 31 3 (L) 2018    MCV 97 2018     2018       Current Facility-Administered Medications   Medication Dose Route Frequency    acetaminophen (TYLENOL) tablet 650 mg  650 mg Oral Q6H PRN    aluminum-magnesium hydroxide-simethicone (MYLANTA) 200-200-20 mg/5 mL oral suspension 15 mL  15 mL Oral Q6H PRN    calcium carbonate (TUMS) chewable tablet 1,000 mg  1,000 mg Oral Daily PRN    docusate sodium (COLACE) capsule 100 mg  100 mg Oral BID    ibuprofen (MOTRIN) tablet 600 mg  600 mg Oral Q6H PRN    lactated ringers infusion  125 mL/hr Intravenous Continuous    measles-mumps-rubella (M-M-R II) subcutaneous injection 0 5 mL  0 5 mL Subcutaneous Once    nalbuphine (NUBAIN) injection 5 mg  5 mg Intravenous Q3H PRN    ondansetron (ZOFRAN) injection 4 mg  4 mg Intravenous Q8H PRN    oxyCODONE-acetaminophen (PERCOCET) 5-325 mg per tablet 1 tablet  1 tablet Oral Q4H PRN    oxyCODONE-acetaminophen (PERCOCET) 5-325 mg per tablet 2 tablet  2 tablet Oral Q4H PRN    senna (SENOKOT) tablet 8 6 mg  1 tablet Oral Daily    simethicone (MYLICON) chewable tablet 80 mg  80 mg Oral 4x Daily PRN       Physical Exam:     Gen: AAOx3, NAD  CV: RRR  Lungs: CTA b/l  Abd: Soft, non-tender, non-distended, no rebound or guarding  Uterine fundus firm and non-tender, at umbilicus, incision is c/d/i  Ext: Non tender    Antoinette Hunt, PGY-1  OB/GYN  6/6/2018  5:55 AM

## 2018-06-06 NOTE — PLAN OF CARE
ALTERATION IN THE BREAST     Optimize infant feeding at the breast Progressing        DISCHARGE PLANNING     Discharge to home or other facility with appropriate resources Progressing        INADEQUATE LATCH, SUCK OR SWALLOW     Demonstrate ability to latch and sustain latch, audible swallowing and satiety Progressing        INFECTION - ADULT     Absence or prevention of progression during hospitalization Progressing     Absence of fever/infection during neutropenic period Progressing        PAIN - ADULT     Verbalizes/displays adequate comfort level or baseline comfort level Progressing        POSTPARTUM     Experiences normal postpartum course Progressing     Appropriate maternal -  bonding Progressing     Establishment of infant feeding pattern Progressing     Incision(s), wounds(s) or drain site(s) healing without S/S of infection Progressing        Prexisting or High Potential for Compromised Skin Integrity     Skin integrity is maintained or improved Progressing        SAFETY ADULT     Patient will remain free of falls Progressing     Maintain or return to baseline ADL function Progressing     Maintain or return mobility status to optimal level Progressing

## 2018-06-07 VITALS
RESPIRATION RATE: 18 BRPM | SYSTOLIC BLOOD PRESSURE: 112 MMHG | TEMPERATURE: 97.7 F | OXYGEN SATURATION: 98 % | WEIGHT: 180 LBS | HEIGHT: 62 IN | BODY MASS INDEX: 33.13 KG/M2 | HEART RATE: 80 BPM | DIASTOLIC BLOOD PRESSURE: 66 MMHG

## 2018-06-07 RX ORDER — OXYCODONE HYDROCHLORIDE AND ACETAMINOPHEN 5; 325 MG/1; MG/1
1 TABLET ORAL EVERY 4 HOURS PRN
Qty: 15 TABLET | Refills: 0 | Status: SHIPPED | OUTPATIENT
Start: 2018-06-07 | End: 2018-06-17

## 2018-06-07 RX ORDER — IBUPROFEN 600 MG/1
600 TABLET ORAL EVERY 6 HOURS PRN
Qty: 30 TABLET | Refills: 1 | Status: SHIPPED | OUTPATIENT
Start: 2018-06-07 | End: 2018-07-18 | Stop reason: ALTCHOICE

## 2018-06-07 RX ADMIN — SENNOSIDES 8.6 MG: 8.6 TABLET ORAL at 08:31

## 2018-06-07 RX ADMIN — DOCUSATE SODIUM 100 MG: 100 CAPSULE, LIQUID FILLED ORAL at 08:31

## 2018-06-07 RX ADMIN — IBUPROFEN 600 MG: 600 TABLET ORAL at 08:31

## 2018-06-07 NOTE — PROGRESS NOTES
Progress Note - OB/GYN   Ivette Valentin 28 y o  female MRN: [id number]  Unit/Bed#: -01 Encounter: [id number]    Assessment:  28 y o  Wolfgang Perez s/p Primary low transverse  section for failure of dilation and category 2 fetal heart tracing post-operative day 3  Chorioamnionitis status post Unasyn x1 intrapartum, and Ancef + metronidazole before and 1x status post delivery  Rubella nonimmune  Patient recovering well, Stable  Discharge today, 2018    Plan:  Continue routine post partum care  Pain management PRN  MMR ordered to be received before discharge  Encourage ambulation  Encourage breastfeeding    Subjective/Objective   Chief Complaint:    Postpartum state    Subjective:   Patient reports she is feeling well  Is having some incisional pain however no other complaints  Pain: yes, incisional, improved with Motrin and Tylenol  States she does not like to take narcotic medications  Discussed with patient that his medications are indicated following major surgery in order to control her pain and she can use them minimally if necessary    Tolerating PO: yes  Voiding: yes  Flatus: yes  BM: no  Ambulating: yes  Breastfeeding:  yes  Chest pain: no  Shortness of breath: no  Leg pain: no  Lochia: minimal    Objective:     Vitals: Temp:  [98 1 °F (36 7 °C)-99 4 °F (37 4 °C)] 98 2 °F (36 8 °C)  HR:  [80-90] 80  Resp:  [18-20] 18  BP: (118-131)/(56-70) 119/60     Physical Exam:   General: NAD, alert, oriented  Cardio: Regular rate and rhythm, no murmur  Resp: nonlabored breathing, clear to auscultation bilaterally  Abdomen: Soft, no distension/rebound/guarding, appropriate tenderness   Fundus: Firm, non-tender, fundus:1 cm below umbilicus  Incision:  Incision closed with running absorbable suture with overlying histoacryl c/d/i  G/U:  Minimal lochia noted on pad  Lower Extremities: Non-tender, no palpable cords, minimal pedal edema noted    Medications:  Current Facility-Administered Medications Medication Dose Route Frequency    acetaminophen (TYLENOL) tablet 650 mg  650 mg Oral Q6H PRN    aluminum-magnesium hydroxide-simethicone (MYLANTA) 200-200-20 mg/5 mL oral suspension 15 mL  15 mL Oral Q6H PRN    calcium carbonate (TUMS) chewable tablet 1,000 mg  1,000 mg Oral Daily PRN    docusate sodium (COLACE) capsule 100 mg  100 mg Oral BID    ibuprofen (MOTRIN) tablet 600 mg  600 mg Oral Q6H PRN    lactated ringers infusion  125 mL/hr Intravenous Continuous    measles-mumps-rubella (M-M-R II) subcutaneous injection 0 5 mL  0 5 mL Subcutaneous Once    nalbuphine (NUBAIN) injection 5 mg  5 mg Intravenous Q3H PRN    ondansetron (ZOFRAN) injection 4 mg  4 mg Intravenous Q8H PRN    oxyCODONE-acetaminophen (PERCOCET) 5-325 mg per tablet 1 tablet  1 tablet Oral Q4H PRN    oxyCODONE-acetaminophen (PERCOCET) 5-325 mg per tablet 2 tablet  2 tablet Oral Q4H PRN    senna (SENOKOT) tablet 8 6 mg  1 tablet Oral Daily    simethicone (MYLICON) chewable tablet 80 mg  80 mg Oral 4x Daily PRN       Marlo Covert  6/7/2018  7:56 AM

## 2018-06-07 NOTE — PLAN OF CARE
ALTERATION IN THE BREAST     Optimize infant feeding at the breast Progressing        DISCHARGE PLANNING     Discharge to home or other facility with appropriate resources Progressing        INADEQUATE LATCH, SUCK OR SWALLOW     Demonstrate ability to latch and sustain latch, audible swallowing and satiety Progressing        INFECTION - ADULT     Absence or prevention of progression during hospitalization Progressing        PAIN - ADULT     Verbalizes/displays adequate comfort level or baseline comfort level Progressing        POSTPARTUM     Experiences normal postpartum course Progressing     Appropriate maternal -  bonding Progressing     Establishment of infant feeding pattern Progressing     Incision(s), wounds(s) or drain site(s) healing without S/S of infection Progressing        Prexisting or High Potential for Compromised Skin Integrity     Skin integrity is maintained or improved Progressing        SAFETY ADULT     Patient will remain free of falls Progressing     Maintain or return to baseline ADL function Progressing     Maintain or return mobility status to optimal level Progressing

## 2018-06-12 ENCOUNTER — OFFICE VISIT (OUTPATIENT)
Dept: OBGYN CLINIC | Facility: CLINIC | Age: 28
End: 2018-06-12

## 2018-06-12 VITALS
BODY MASS INDEX: 29.33 KG/M2 | SYSTOLIC BLOOD PRESSURE: 98 MMHG | WEIGHT: 159.4 LBS | HEIGHT: 62 IN | DIASTOLIC BLOOD PRESSURE: 70 MMHG

## 2018-06-12 DIAGNOSIS — Z98.891 S/P PRIMARY LOW TRANSVERSE C-SECTION: ICD-10-CM

## 2018-06-12 PROCEDURE — 99024 POSTOP FOLLOW-UP VISIT: CPT | Performed by: OBSTETRICS & GYNECOLOGY

## 2018-06-14 NOTE — PROGRESS NOTES
This is a 18-year-old white female, she is approximately 6 8 days status post primary  section  She denies any particular discomfort  Incision looks good  The angle suture was removed  There is no evidence of postpartum depression or baby blues  Nursing is going well  Return to office in 2 weeks for visit with the nurse

## 2018-06-26 ENCOUNTER — POSTPARTUM VISIT (OUTPATIENT)
Dept: OBGYN CLINIC | Facility: CLINIC | Age: 28
End: 2018-06-26

## 2018-06-26 VITALS
SYSTOLIC BLOOD PRESSURE: 110 MMHG | BODY MASS INDEX: 28.52 KG/M2 | HEIGHT: 62 IN | WEIGHT: 155 LBS | DIASTOLIC BLOOD PRESSURE: 70 MMHG

## 2018-06-26 DIAGNOSIS — N76.0 ACUTE VAGINITIS: ICD-10-CM

## 2018-06-26 DIAGNOSIS — N61.0 MASTITIS, RIGHT, ACUTE: ICD-10-CM

## 2018-06-26 DIAGNOSIS — Z98.891 S/P PRIMARY LOW TRANSVERSE C-SECTION: ICD-10-CM

## 2018-06-26 PROCEDURE — 99024 POSTOP FOLLOW-UP VISIT: CPT | Performed by: OBSTETRICS & GYNECOLOGY

## 2018-06-26 RX ORDER — FLUCONAZOLE 150 MG/1
150 TABLET ORAL ONCE
Qty: 1 TABLET | Refills: 0 | Status: SHIPPED | OUTPATIENT
Start: 2018-06-26 | End: 2018-06-26

## 2018-06-26 RX ORDER — CEPHALEXIN 500 MG/1
500 CAPSULE ORAL EVERY 12 HOURS SCHEDULED
Qty: 14 CAPSULE | Refills: 0 | Status: SHIPPED | OUTPATIENT
Start: 2018-06-26 | End: 2018-06-29 | Stop reason: CLARIF

## 2018-06-26 NOTE — PROGRESS NOTES
3 wk postpartum appt:  18    Pit augment - L/T C/S  (dilated to 8 cm)  "Anevay"  7 lb 10/8 oz      Lochia - red brown spotting - light flow  Normal bowel & bladder habits  Incision -healing well, some tenderness to touch  breastfeeding q 1-3 hrs - good milk supply, latching well  (R) breast redness above nipple x past 5 days, afebrile - was using warm compresses & massage  Will tx breast sx with Keflex 500 mg bid x 7 days  Ped - Dr Claude Colas - gaining weight - next appt 18  Naps some days   back to work - family in area  Staying @ home until 18 then return to work  Birth control - prev on [employer] - prior ocp use - had freq BTB  Had TDAP during pregnancy  Completed [place]  dep scale  Does not think she received Rubella immunization postpartum (equivocal result during preg)  Return to office for 6 wk postpartum appt,  Hx MRSA groin & (L) arm - prev used higher dose Bacatracin more effective

## 2018-06-29 ENCOUNTER — POSTPARTUM VISIT (OUTPATIENT)
Dept: OBGYN CLINIC | Facility: CLINIC | Age: 28
End: 2018-06-29
Payer: COMMERCIAL

## 2018-06-29 ENCOUNTER — TELEPHONE (OUTPATIENT)
Dept: OBGYN CLINIC | Facility: CLINIC | Age: 28
End: 2018-06-29

## 2018-06-29 VITALS
BODY MASS INDEX: 26.29 KG/M2 | SYSTOLIC BLOOD PRESSURE: 110 MMHG | HEIGHT: 64 IN | DIASTOLIC BLOOD PRESSURE: 78 MMHG | WEIGHT: 154 LBS

## 2018-06-29 DIAGNOSIS — N61.1 ABSCESS OF RIGHT BREAST: Primary | ICD-10-CM

## 2018-06-29 PROCEDURE — 99213 OFFICE O/P EST LOW 20 MIN: CPT | Performed by: NURSE PRACTITIONER

## 2018-06-29 RX ORDER — CLINDAMYCIN HYDROCHLORIDE 300 MG/1
300 CAPSULE ORAL 4 TIMES DAILY
Qty: 40 CAPSULE | Refills: 0 | Status: SHIPPED | OUTPATIENT
Start: 2018-06-29 | End: 2018-07-09

## 2018-06-29 NOTE — PROGRESS NOTES
Assessment/Plan:      Diagnoses and all orders for this visit:    Abscess of right breast  -     clindamycin (CLEOCIN) 300 MG capsule; Take 1 capsule (300 mg total) by mouth 4 (four) times a day for 10 days  -     mupirocin (BACTROBAN) 2 % ointment; Apply topically 3 (three) times a day for 10 days        Antibiotic has been changed to Clindamycin 300 mg QID for 10 days in addition patient requested a script for Bactroban ointment which had been used in the past as well  I & D of the site was deferred today because she is lactating and the location of the abscess is above the nipple  She was advised if site begins to drain she is to keep it covered to avoid contact with infant  She should pump on right side and if she is certain that there has been no contamination of the milk with the drainage then she can use it  Otherwise I suggested she pump and dump from the rights side if the abscess begins to drain  She is to return to the office on Monday for evaluation of site  All questions and concerns addressed and patient verbalized understanding  Subjective:     Patient ID: Brett Cho is a 33 y o  female  She is 3 weeks post partum  She is currently nursing  She presents today for evaluation of right breat lump/redness which started 7 days ago  She was started on Keflex 2 days ago  She states the site has worsened, it has increased in size and it is much more tender  She denies fevers or chills  She has a hx of MRSA  She is concerned that this may be MRSA as this is how her past abscesses have presented in the past      HPI    Review of Systems   Constitutional: Negative for chills and fever  Skin:        Right breast redness, pain, lump   All other systems reviewed and are negative  Objective:     Physical Exam   Constitutional: Vital signs are normal  She appears well-developed and well-nourished  Pulmonary/Chest:       Skin: Skin is warm

## 2018-06-29 NOTE — TELEPHONE ENCOUNTER
Pt here for 3 wk postpartum appt 6/26/18 - was treated with Keflex for redness (R) breast with keflex x 7 days - has completed 3 days - no improvement  Concerned may be MRSA - pt has hx MRSA (see 3 wk postpartum note) - appt scheduled to evaluate

## 2018-07-02 ENCOUNTER — POSTPARTUM VISIT (OUTPATIENT)
Dept: OBGYN CLINIC | Facility: CLINIC | Age: 28
End: 2018-07-02
Payer: COMMERCIAL

## 2018-07-02 VITALS
WEIGHT: 152 LBS | HEIGHT: 62 IN | SYSTOLIC BLOOD PRESSURE: 120 MMHG | DIASTOLIC BLOOD PRESSURE: 78 MMHG | BODY MASS INDEX: 27.97 KG/M2

## 2018-07-02 DIAGNOSIS — N61.1 ABSCESS OF RIGHT BREAST: Primary | ICD-10-CM

## 2018-07-02 PROCEDURE — 99212 OFFICE O/P EST SF 10 MIN: CPT | Performed by: NURSE PRACTITIONER

## 2018-07-02 NOTE — PROGRESS NOTES
Assessment/Plan:      Diagnoses and all orders for this visit:    Abscess of right breast        There is marked improvement noted of the right abscess  She is to follow up as scheduled for her 6 week postpartum appt in 3 weeks or sooner if needed  Subjective:     Patient ID: Riri Gaines is a 33 y o  female is here for re-evaluation of right breast abscess  She was seen in the office 3 days ago  I started her on Clindamycin 300 mg QID and Bactroban  She states there is marked improvement of site  HPI    Review of Systems   Constitutional: Negative for chills and fever  Skin:        Right breast abscess   All other systems reviewed and are negative  Objective:     Physical Exam   Constitutional: She appears well-developed and well-nourished  HENT:   Head: Normocephalic and atraumatic  Neck: Neck supple  Pulmonary/Chest:       Neurological: She is alert  Skin: Skin is warm

## 2018-07-05 ENCOUNTER — TELEPHONE (OUTPATIENT)
Dept: POSTPARTUM | Facility: CLINIC | Age: 28
End: 2018-07-05

## 2018-07-05 PROBLEM — N76.4 BOIL OF VULVA: Status: ACTIVE | Noted: 2017-05-22

## 2018-07-05 NOTE — TELEPHONE ENCOUNTER
Orlin Rivera is currently being treated with Clindamycin for a presumed MRSA infection which caused an abcess on her areola  The abcess opened and drained after 24 hours of tx  She was initially placed on Keflex but then switched when she did not respond  Cultures were not sent but she has a PMH of MRSA  Initially she was told it was OK to nurse but then was told to pump and dump  She is looking for guidance on how to safely proceed  At this time she is nursing on the unaffected side, pumping an storing milk from the affected side  I told Orlin Rivera I would speak with Dr Deepti Paulino and get back to her tomorrow  Purnima verbalized understanding

## 2018-07-06 ENCOUNTER — TELEPHONE (OUTPATIENT)
Dept: POSTPARTUM | Facility: CLINIC | Age: 28
End: 2018-07-06

## 2018-07-06 ENCOUNTER — OFFICE VISIT (OUTPATIENT)
Dept: FAMILY MEDICINE CLINIC | Facility: CLINIC | Age: 28
End: 2018-07-06
Payer: COMMERCIAL

## 2018-07-06 VITALS
HEIGHT: 63 IN | DIASTOLIC BLOOD PRESSURE: 80 MMHG | HEART RATE: 84 BPM | BODY MASS INDEX: 27.5 KG/M2 | TEMPERATURE: 97.5 F | RESPIRATION RATE: 18 BRPM | WEIGHT: 155.2 LBS | SYSTOLIC BLOOD PRESSURE: 124 MMHG

## 2018-07-06 DIAGNOSIS — Z23 NEED FOR MMR VACCINE: ICD-10-CM

## 2018-07-06 DIAGNOSIS — N61.1 ABSCESS OF RIGHT BREAST: Primary | ICD-10-CM

## 2018-07-06 PROCEDURE — 99213 OFFICE O/P EST LOW 20 MIN: CPT | Performed by: FAMILY MEDICINE

## 2018-07-06 PROCEDURE — 90707 MMR VACCINE SC: CPT | Performed by: FAMILY MEDICINE

## 2018-07-06 PROCEDURE — 90471 IMMUNIZATION ADMIN: CPT | Performed by: FAMILY MEDICINE

## 2018-07-06 NOTE — TELEPHONE ENCOUNTER
I spoke with Orlin Rivera regarding Dr Kassidy Charles recommendations regarding resuming breastfeeding or feeding pumped milk while she is being treated for presumed MRSA abscess on her breast   Dr Deepti Paulino agrees with recommendations in "Breastfeeding and Human Lactation 5th Edition" that it is safe to resume breastfeeding at this time and her stored milk (expressed after treatment began) is safe to use  I offered assistance with achieving comfortable latch with Purnima which she declined at this time  She verbalized understanding and will call back with any additional questions or concerns

## 2018-07-09 PROBLEM — Z23 NEED FOR MMR VACCINE: Status: ACTIVE | Noted: 2018-07-09

## 2018-07-09 PROBLEM — N61.1 ABSCESS OF RIGHT BREAST: Status: ACTIVE | Noted: 2018-07-09

## 2018-07-10 NOTE — PROGRESS NOTES
Assessment/Plan:       Problem List Items Addressed This Visit     Abscess of right breast - Primary     Improving  No systemic symptoms   Continue Clindamycin 300 mg and Bactroban 2% ointment TID as directed by gynecologist   Notify office if abscess worsens or does not resolve   Referral to ID, per pt request, for multiple MRSA infection on  and   Relevant Orders    Ambulatory referral to Infectious Disease    Need for MMR vaccine  - Negative Rubella antibody 10/27/17  - Verified with [facility], ok to give MMR ( live vaccine) in breast feeding mothers  - Vaccine administered     Relevant Orders    MMR VACCINE SQ (Completed)            Subjective:      Patient ID: Sandi Ward is a 33 y o  female     33 y o  POD #33 following a Primary LTCS who presents with a breast abscess on the right breast  Pt is on day 5 of antibiotics prescribed by OB/GYN clinic  She says the swelling and redness on the breast has gotten before since starting the antibiotics  She is here requesting a referral to ID  Pt has had multiple MRSA skin infections in the past and advised by her obstetrician to see ID  Pt also was found to be rubella non immune post partum and is here for MMR  Breast feedings going well  She has been avoiding the infected breast          The following portions of the patient's history were reviewed and updated as appropriate: allergies, current medications, past family history, past medical history, past social history, past surgical history and problem list     Review of Systems   Constitutional: Positive for fatigue  Negative for appetite change, chills and fever  Respiratory: Negative for cough, shortness of breath and wheezing  Cardiovascular: Negative for chest pain, palpitations and leg swelling  Gastrointestinal: Negative for abdominal pain, diarrhea, nausea and vomiting  Genitourinary: Negative for difficulty urinating, dysuria, genital sores and vaginal bleeding     Skin: Positive for color change and wound  Neurological: Negative for dizziness, light-headedness, numbness and headaches  Psychiatric/Behavioral: Negative for agitation, behavioral problems and self-injury  objective:      /80   Pulse 84   Temp 97 5 °F (36 4 °C)   Resp 18   Ht 5' 3 1" (1 603 m)   Wt 70 4 kg (155 lb 3 2 oz)   LMP 08/14/2017 (Exact Date)   BMI 27 41 kg/m²          Physical Exam   Constitutional: She is oriented to person, place, and time  She appears well-developed and well-nourished  No distress  Breast feeding with other daughter in the room    HENT:   Head: Normocephalic and atraumatic  Mouth/Throat: No oropharyngeal exudate  Neck: Neck supple  Cardiovascular: Normal rate, regular rhythm, normal heart sounds and intact distal pulses  Pulmonary/Chest: Effort normal and breath sounds normal  No respiratory distress  She has no wheezes  She has no rales  Right breast exhibits skin change  Right breast exhibits no inverted nipple, no nipple discharge and no tenderness  Abdominal: Soft  Bowel sounds are normal  She exhibits no distension  There is no tenderness  There is no rebound  Musculoskeletal: Normal range of motion  She exhibits no edema  Neurological: She is alert and oriented to person, place, and time  Skin: Skin is warm

## 2018-07-10 NOTE — ASSESSMENT & PLAN NOTE
Improving  No systemic symptoms   Continue Clindamycin 300 mg and Bactroban as directed by gynecologist   Notify office if abscess worsens or does not resolve   Referral to ID, per pt request, for multiple MRSA infection on 2006 and 2014

## 2018-07-18 ENCOUNTER — POSTPARTUM VISIT (OUTPATIENT)
Dept: OBGYN CLINIC | Facility: CLINIC | Age: 28
End: 2018-07-18

## 2018-07-18 VITALS
HEIGHT: 62 IN | DIASTOLIC BLOOD PRESSURE: 80 MMHG | BODY MASS INDEX: 28.34 KG/M2 | WEIGHT: 154 LBS | SYSTOLIC BLOOD PRESSURE: 110 MMHG

## 2018-07-18 DIAGNOSIS — Z98.891 S/P C-SECTION: Primary | ICD-10-CM

## 2018-07-18 PROCEDURE — 99024 POSTOP FOLLOW-UP VISIT: CPT | Performed by: OBSTETRICS & GYNECOLOGY

## 2018-07-18 NOTE — PATIENT INSTRUCTIONS
Doing well status post primary  section nursing is going well no problem postpartum depression to use condoms contraception return to office in 6 months

## 2018-07-18 NOTE — PROGRESS NOTES
This is a 30-year-old white female, she is approximately 6 weeks status post primary  section for arrest of descent  She is doing well there is no problem with postpartum depression  Nursing is going well  The infant is growing and thriving  She will prior use condoms for contraception  Examination of the breasts the abdomen the  scar goal exam all within normal limits  All restrictions are lifted  She should return my office in 6 months

## 2018-08-08 ENCOUNTER — OFFICE VISIT (OUTPATIENT)
Dept: INFECTIOUS DISEASES | Facility: CLINIC | Age: 28
End: 2018-08-08
Payer: COMMERCIAL

## 2018-08-08 VITALS
RESPIRATION RATE: 20 BRPM | HEIGHT: 62 IN | HEART RATE: 73 BPM | WEIGHT: 151.2 LBS | TEMPERATURE: 96.9 F | DIASTOLIC BLOOD PRESSURE: 68 MMHG | SYSTOLIC BLOOD PRESSURE: 98 MMHG | BODY MASS INDEX: 27.82 KG/M2

## 2018-08-08 DIAGNOSIS — Z98.891 S/P PRIMARY LOW TRANSVERSE C-SECTION: ICD-10-CM

## 2018-08-08 DIAGNOSIS — A49.02 MRSA (METHICILLIN RESISTANT STAPHYLOCOCCUS AUREUS): Primary | ICD-10-CM

## 2018-08-08 DIAGNOSIS — N61.1 ABSCESS OF RIGHT BREAST: ICD-10-CM

## 2018-08-08 PROCEDURE — 99244 OFF/OP CNSLTJ NEW/EST MOD 40: CPT | Performed by: INTERNAL MEDICINE

## 2018-08-08 NOTE — PROGRESS NOTES
Outpatient Consultation - Infectious Disease   Mirian Gold 33 y o  female MRN: [id number]      IMPRESSION & RECOMMENDATIONS:   Impression/Recommendations:  1  Recurrent MRSA skin/soft tissue infection  Possibly acquired occupationally  Now likely colonized, along with intimate partner  No active skin lesions at this time  Rec:   · Provided patient information on decolonization including instructions on Hibiclens daily bathing, mupirocin intranasally b i d , and personal/environmental hygiene  · Advised her that while she may have passed this to her child, unless the child is exhibiting signs of active infection this should be of no concern  If the child does develop recurrent skin lesions she should be evaluated by her pediatrician  · Advised her intimate partner to undergo MRSA decolonization as well if she elects to do this  · Advised common sense and hand hygiene in times of active lesions to avoid spreading it to her partner and child    2  Postpartum state  Patient and baby doing well    RTO PRN  Antibiotics:  None    Thank you for this consultation  HISTORY OF PRESENT ILLNESS:  Reason for Consult: Recurrent MRSA infection    HPI: Mirian Gold is a 33 y o  female healthy status post LTCS in early June  She developed acute mastitis in Late June and was started on Keflex for 7 days  Despite taking these antibiotics she developed an abscess and her antibiotics were changed to Clindamycin and bactroban  She was noted in follow-up to have marked improvement  She reports however that she has had recurrent MRSA infections since 20 13  She works at [employer] and thinks she acquired it from a client  She gets at age in all infections most recently during her pregnancy also in her groin area  These have sometimes required I and D which she has never required hospitalization  She has also states that her boyfriend gets recurrent skin infections which she presumes are MRSA    Her daughter remains well   We are asked to comment on further evaluation and management  REVIEW OF SYSTEMS:  Denies fevers, chills, sweats, nausea, vomiting, or diarrhea  She has no current active skin lesions at this time  A complete system-based review of systems is otherwise negative  PAST MEDICAL HISTORY:  Past Medical History:   Diagnosis Date    HPV in female     Seasonal allergies     Urinary tract infection     Varicella     childhood     Past Surgical History:   Procedure Laterality Date    COLONOSCOPY W/ BIOPSIES      MD  DELIVERY ONLY N/A 2018    Procedure:  SECTION (); Surgeon: Sheila Leal MD;  Location: [facility];  Service: Obstetrics    WISDOM TOOTH EXTRACTION         FAMILY HISTORY:  Non-contributory    SOCIAL HISTORY:  History   Alcohol Use    1 8 oz/week    3 Standard drinks or equivalent per week     Comment: no alcohol since pregnant     History   Drug Use No     History   Smoking Status    Former Smoker   Smokeless Tobacco    Never Used       ALLERGIES:  Allergies   Allergen Reactions    Nitrofurantoin Anaphylaxis       MEDICATIONS:  All current active medications have been reviewed  PHYSICAL EXAM:  Vitals:  BP 98/68   Pulse 73   Temp (!) 96 9 °F (36 1 °C) (Tympanic)   Resp 20   Ht 5' 2" (1 575 m)   Wt 68 6 kg (151 lb 3 2 oz)   BMI 27 65 kg/m²      Physical Exam:  General:  Well-nourished, well-developed, in no acute distress  Eyes:  Conjunctive clear with no hemorrhages or effusions  Oropharynx:  No ulcers, no lesions  Neck:  Supple, no lymphadenopathy  Lungs:  Clear to auscultation bilaterally, no accessory muscle use  Cardiac:  Regular rate and rhythm, no murmurs  Abdomen:  Soft, non-tender, non-distended  Extremities:  No peripheral cyanosis, clubbing, or edema  Skin:  No rashes, no ulcers, right breast scar well healed  Neurological:  Moves all four extremities spontaneously, sensation grossly intact    LABS, IMAGING, & OTHER STUDIES:  Lab Results:   I have personally reviewed pertinent labs  None    Imaging Studies:   I have personally reviewed pertinent imaging study reports and images in PACS  None    EKG, Pathology, and Other Studies:   I have personally reviewed pertinent reports

## 2018-11-30 ENCOUNTER — OFFICE VISIT (OUTPATIENT)
Dept: FAMILY MEDICINE CLINIC | Facility: CLINIC | Age: 28
End: 2018-11-30
Payer: COMMERCIAL

## 2018-11-30 VITALS
HEART RATE: 78 BPM | WEIGHT: 158.4 LBS | DIASTOLIC BLOOD PRESSURE: 70 MMHG | TEMPERATURE: 98.7 F | HEIGHT: 64 IN | SYSTOLIC BLOOD PRESSURE: 102 MMHG | RESPIRATION RATE: 14 BRPM | BODY MASS INDEX: 27.04 KG/M2

## 2018-11-30 DIAGNOSIS — Z00.00 PREVENTATIVE HEALTH CARE: Primary | ICD-10-CM

## 2018-11-30 DIAGNOSIS — Z23 NEED FOR IMMUNIZATION AGAINST INFLUENZA: ICD-10-CM

## 2018-11-30 PROBLEM — O41.1290 CHORIOAMNIONITIS: Status: RESOLVED | Noted: 2018-06-04 | Resolved: 2018-11-30

## 2018-11-30 PROBLEM — O42.90 AMNIOTIC FLUID LEAKING: Status: RESOLVED | Noted: 2018-06-04 | Resolved: 2018-11-30

## 2018-11-30 PROBLEM — N76.4 BOIL OF VULVA: Status: RESOLVED | Noted: 2017-05-22 | Resolved: 2018-11-30

## 2018-11-30 PROBLEM — N61.1 ABSCESS OF RIGHT BREAST: Status: RESOLVED | Noted: 2018-07-09 | Resolved: 2018-11-30

## 2018-11-30 PROCEDURE — 90471 IMMUNIZATION ADMIN: CPT | Performed by: FAMILY MEDICINE

## 2018-11-30 PROCEDURE — 99395 PREV VISIT EST AGE 18-39: CPT | Performed by: FAMILY MEDICINE

## 2018-11-30 PROCEDURE — 90686 IIV4 VACC NO PRSV 0.5 ML IM: CPT | Performed by: FAMILY MEDICINE

## 2018-11-30 NOTE — PROGRESS NOTES
Jay Spears 1990 female MRN: [id number]    Health Maintenance Visit      SUBJECTIVE    HPI:  Jay Spears is a 33 y o  female who presented for a routine health maintenance visit  Pap test questions: periods are regular  no unusual pelvic pain  no unusual vaginal discharge  no previous abnormal Pap tests  Had an abnormal Pap test in   Health Maintenance   Topic Date Due    INFLUENZA VACCINE  2018    Depression Screening PHQ  2019    PAP SMEAR  10/31/2020    DTaP,Tdap,and Td Vaccines (3 - Td) 2028     LMP 2018  Pap smear performed 2017, HPV and cytology negative   CRC screening: No personal or family history of colon cancer or colon polyps  BrCa screening: There is no personal or family history of breast cancer  She denies finding new breast lumps, breast pain or nipple discharge  CVS screening: Patient denies any exertional chest pain, dyspnea, palpitations, syncope, orthopnea, edema or paroxysmal nocturnal dyspnea  Father has Factor 5 Leiden mutation and grandfather had valvular ds  DM screening: No polyuria, polydipsia, blurry vision, chest pain, dyspnea or claudication  No foot burning, numbness or pain  No personal or family history of skin cancers or melanoma  Review of Systems   Constitutional: Negative  Respiratory: Negative  Cardiovascular: Negative  Genitourinary: Negative  Neurological: Negative  Hematological: Negative  Historical Information   Past Medical History:   Diagnosis Date    HPV in female     Seasonal allergies     Urinary tract infection     Varicella     childhood     Past Surgical History:   Procedure Laterality Date    COLONOSCOPY W/ BIOPSIES      OK  DELIVERY ONLY N/A 2018    Procedure:  SECTION ();   Surgeon: Shamir Duke MD;  Location: [facility];  Service: Obstetrics    WISDOM TOOTH EXTRACTION       Family History   Problem Relation Age of Onset    Sickle cell trait Other     Factor V Leiden deficiency Father     Arthritis Maternal Grandmother     Depression Maternal Grandmother     Miscarriages / Stillbirths Maternal Grandmother     Arthritis Maternal Grandfather     Cancer Maternal Grandfather         prostate     Social History   History   Alcohol Use    1 8 oz/week    3 Standard drinks or equivalent per week     Comment: no alcohol since pregnant     History   Drug Use No     History   Smoking Status    Former Smoker   Smokeless Tobacco    Never Used       Medications:    No current outpatient prescriptions on file  No current facility-administered medications for this visit  No current outpatient prescriptions on file  Allergies   Allergen Reactions    Nitrofurantoin Anaphylaxis       OBJECTIVE  Vitals:   Vitals:    11/30/18 1421   BP: 102/70   BP Location: Left arm   Patient Position: Sitting   Cuff Size: Standard   Pulse: 78   Resp: 14   Temp: 98 7 °F (37 1 °C)   TempSrc: Tympanic   Weight: 71 8 kg (158 lb 6 4 oz)   Height: 5' 3 7" (1 618 m)     Wt Readings from Last 3 Encounters:   11/30/18 71 8 kg (158 lb 6 4 oz)   08/08/18 68 6 kg (151 lb 3 2 oz)   07/18/18 69 9 kg (154 lb)     Body mass index is 27 45 kg/m²  BP Readings from Last 3 Encounters:   11/30/18 102/70   08/08/18 98/68   07/18/18 110/80     Pulse Readings from Last 3 Encounters:   11/30/18 78   08/08/18 73   07/06/18 84       No LMP recorded  Physical Exam   Constitutional: She is oriented to person, place, and time  She appears well-developed and well-nourished  No distress  Eyes: EOM are normal  Right eye exhibits no discharge  Left eye exhibits no discharge  Neck: Neck supple  Cardiovascular: Normal rate, regular rhythm and normal heart sounds  No murmur heard  Pulmonary/Chest: Effort normal and breath sounds normal  No respiratory distress  She has no wheezes  She has no rales  Abdominal: Soft  She exhibits no distension  There is no rebound and no guarding  Musculoskeletal: She exhibits no edema  Lymphadenopathy:     She has no cervical adenopathy  Neurological: She is alert and oriented to person, place, and time  Skin: Skin is warm  No rash noted  Vitals reviewed  Lab:  I have personally reviewed all pertinent results       Admission on 06/04/2018, Discharged on 06/07/2018   Component Date Value Ref Range Status    WBC 06/04/2018 16 85* 4 31 - 10 16 Thousand/uL Final    RBC 06/04/2018 4 64  3 81 - 5 12 Million/uL Final    Hemoglobin 06/04/2018 14 6  11 5 - 15 4 g/dL Final    Hematocrit 06/04/2018 43 5  34 8 - 46 1 % Final    MCV 06/04/2018 94  82 - 98 fL Final    MCH 06/04/2018 31 5  26 8 - 34 3 pg Final    MCHC 06/04/2018 33 6  31 4 - 37 4 g/dL Final    RDW 06/04/2018 13 1  11 6 - 15 1 % Final    MPV 06/04/2018 9 8  8 9 - 12 7 fL Final    Platelets 28/31/5915 328  149 - 390 Thousands/uL Final    nRBC 06/04/2018 0  /100 WBCs Final    Neutrophils Relative 06/04/2018 80* 43 - 75 % Final    Immat GRANS % 06/04/2018 1  0 - 2 % Final    Lymphocytes Relative 06/04/2018 13* 14 - 44 % Final    Monocytes Relative 06/04/2018 6  4 - 12 % Final    Eosinophils Relative 06/04/2018 0  0 - 6 % Final    Basophils Relative 06/04/2018 0  0 - 1 % Final    Neutrophils Absolute 06/04/2018 13 41* 1 85 - 7 62 Thousands/µL Final    Immature Grans Absolute 06/04/2018 0 08  0 00 - 0 20 Thousand/uL Final    Lymphocytes Absolute 06/04/2018 2 25  0 60 - 4 47 Thousands/µL Final    Monocytes Absolute 06/04/2018 1 04  0 17 - 1 22 Thousand/µL Final    Eosinophils Absolute 06/04/2018 0 03  0 00 - 0 61 Thousand/µL Final    Basophils Absolute 06/04/2018 0 04  0 00 - 0 10 Thousands/µL Final    RPR 06/04/2018 Non-Reactive  Non-Reactive Final    ABO Grouping 06/04/2018 AB   Final    Rh Factor 06/04/2018 Positive   Final    Antibody Screen 06/04/2018 Negative   Final    Specimen Expiration Date 06/04/2018 [id number]   Final    pH, Cord Enrrique 06/04/2018 7 374  7 190 - 7 490 Final    pCO2, Cord Enrrique 06/04/2018 36 7  27 0 - 43 0 mm HG Final    pO2, Cord Enrrique 06/04/2018 20 7  15 0 - 45 0 mm HG Final    HCO3, Cord Enrrique 06/04/2018 20 9  12 2 - 28 6 mmol/L Final    Base Exc, Cord Enrrique 06/04/2018 -3 6* 1 0 - 9 0 mmol/L Final    O2 Cont, Cord Enrrique 06/04/2018 11 1  mL/dL Final    O2 HGB,VENOUS CORD 06/04/2018 51 0  % Final    pH, Cord Art 06/04/2018 7 342  7 230 - 7 430 Final    pCO2, Cord Art 06/04/2018 43 1  30 0 - 60 0 Final    pO2, Cord Art 06/04/2018 14 5  5 0 - 25 0 mm HG Final    HCO3, Cord Art 06/04/2018 22 8  17 3 - 27 3 mmol/L Final    Base Exc, Cord Art 06/04/2018 -2 9* 3 0 - 11 0 mmol/L Final    O2 Content, Cord Art 06/04/2018 6 4  ml/dl Final    O2 Hgb, Arterial Cord 06/04/2018 29 5  % Final    Case Report 06/04/2018    Final                    Value:Surgical Pathology Report                         Case: [id number]                                   Authorizing Provider:  Ernestine Colon MD         Collected:           06/04/2018 1623              Ordering Location:     [address] [place] [address]      Received:            06/05/2018 [facility] [facility] and                                                                                  Delivery                                                                     Pathologist:           Cricket West DO                                                            Specimen:    Placenta, Gestational age 43 weeks                                                         Final Diagnosis 06/04/2018    Final                    Value: This result contains rich text formatting which cannot be displayed here   Additional Information 06/04/2018    Final                    Value: This result contains rich text formatting which cannot be displayed here  Clotilde Richeym Description 06/04/2018    Final                    Value: This result contains rich text formatting which cannot be displayed here      Clinical Information 2018    Final                    Value:Gestational Age: 36 weeks Fetal /  Indications: tachycardia suspicion of chorio Maternal Indications: maternal temp in labor   Placental Indications:none    WBC 2018 28 77* 4 31 - 10 16 Thousand/uL Final    RBC 2018 3 23* 3 81 - 5 12 Million/uL Final    Hemoglobin 2018 10 3* 11 5 - 15 4 g/dL Final    Hematocrit 2018 31 3* 34 8 - 46 1 % Final    MCV 2018 97  82 - 98 fL Final    MCH 2018 31 9  26 8 - 34 3 pg Final    MCHC 2018 32 9  31 4 - 37 4 g/dL Final    RDW 2018 13 2  11 6 - 15 1 % Final    MPV 2018 10 0  8 9 - 12 7 fL Final    Platelets  241  149 - 390 Thousands/uL Final    nRBC 2018 0  /100 WBCs Final    Segmented % 2018 68  43 - 75 % Final    Bands % 2018 13* 0 - 8 % Final    Lymphocytes % 2018 11* 14 - 44 % Final    Monocytes % 2018 4  4 - 12 % Final    Eosinophils, % 2018 1  0 - 6 % Final    Basophils % 2018 0  0 - 1 % Final    Metamyelocytes% 2018 3* 0 - 1 % Final    Absolute Neutrophils 2018 23 30* 1 85 - 7 62 Thousand/uL Final    Lymphocytes Absolute 2018 3 16  0 60 - 4 47 Thousand/uL Final    Monocytes Absolute 2018 1 15  0 00 - 1 22 Thousand/uL Final    Eosinophils Absolute 2018 0 29  0 00 - 0 40 Thousand/uL Final    Basophils Absolute 2018 0 00  0 00 - 0 10 Thousand/uL Final    RBC Morphology 2018 Present   Final    Anisocytosis 2018 Present   Final    Platelet Estimate  Adequate  Adequate Final         Purnima was seen today for physical exam     Diagnoses and all orders for this visit:    Preventative health care  -     Lipid Panel with Direct LDL reflex;  Future    Need for immunization against influenza  -     SYRINGE/SINGLE-DOSE VIAL: influenza vaccine, 4650-4028, quadrivalent, 0 5 mL, preservative-free (AFLURIA, FLUARIX, FLULAVAL, FLUZONE)      No orders of the defined types were placed in this encounter  In addition to the above, the patient was counseled on general preventative health care subjects, including but not limited to:  - Nutrition, healthy weight, aerobic and weight-bearing exercise  - Mental health, social support, and self care  - Patient made aware of  services at the office  Flu shot given today   Pap smear due 6/2020    Most Recent Immunizations   Administered Date(s) Administered    Influenza 12/04/2017    Influenza Quadrivalent Preservative Free 3 years and older IM 12/04/2017    Influenza TIV (IM) 11/10/2015    Influenza, injectable, quadrivalent, preservative free 0 5 mL 11/30/2018    MMR 07/06/2018    Tdap 05/30/2018     Return to [facility] in 1 year for annual  visit     PCP: Hillary Curtis MD    Future Appointments  Date Time Provider Jaimee Vasques   1/21/2019 9:00 [facility]           _____________________________________________________________________     Hillary Curtis MD, PGY-2  [facility]   11/30/2018

## 2019-01-21 ENCOUNTER — ANNUAL EXAM (OUTPATIENT)
Dept: OBGYN CLINIC | Facility: CLINIC | Age: 29
End: 2019-01-21
Payer: COMMERCIAL

## 2019-01-21 VITALS
BODY MASS INDEX: 28.92 KG/M2 | SYSTOLIC BLOOD PRESSURE: 108 MMHG | DIASTOLIC BLOOD PRESSURE: 76 MMHG | HEIGHT: 63 IN | WEIGHT: 163.2 LBS

## 2019-01-21 DIAGNOSIS — Z01.419 WOMEN'S ANNUAL ROUTINE GYNECOLOGICAL EXAMINATION: Primary | ICD-10-CM

## 2019-01-21 PROCEDURE — S0612 ANNUAL GYNECOLOGICAL EXAMINA: HCPCS | Performed by: OBSTETRICS & GYNECOLOGY

## 2019-01-21 NOTE — PROGRESS NOTES
HPI    this is a 25-year-old white female, she is a  1 para 1 with 1 child born approximately 7 months ago via  section  She is bottle-feeding  Her menstrual cycles have returned to normal   She denies any major gynecological  GI complaint  She is not using any formal method of contraception at this time  We had a discussion about other methods  She is strongly considering the intrauterine device  She denies any problem with depression or anxiety  She is not happy with her weight  She sees a dentist on a regular basis  There are no new major family illnesses report this time  REVIEW of systems systems     the patient is not happy with her weight  She will exercise more  There is no other major system complaint at this time          MEDICAL HISTORY    no current major medical issue at this present time  SURGICAL history       the patient's history is significant for  section approximately 7 months ago  FAMILY HISTORY    family history significant for factor 5 deficiency, prostate cancer, Alzheimer's disease, and heart disease      SOCIAL HISTORY   the patient is a former smoker, positive for social alcohol  PHYSICAL EXAM    this is a well-developed well-nourished white female acute distress, her BMI is 28 9  Her HEENT is was within normal limits  Cardiac exam shows a regular rhythm and rate normal S1-S2  Lungs clear auscultation bilaterally  Breast exam symmetrical nontender no retraction or dimpling axilla clear bilaterally  Her abdomen is softer no masses positive bowel sounds  Her  Pfannenstiel skin incision is healing well  Pelvic exam the external genitalia normal limits the vagina is clean the cervix is closed  A Pap smear was performed  The uterus is midposition normal size  The adnexa otherwise unremarkable  There is no cervical motion tenderness  IMPRESSION    the patient is doing well 7 months status post  section  Her menstrual cycles are regular predictable  We had a discussion about contraception  She may consider using the IUD  A brochure was given  She will keep me informed of her decision  A Pap smear was performed  Should return my office in 1 year unless she has another method of contraception

## 2019-01-21 NOTE — PATIENT INSTRUCTIONS
The patient was informed of a stable gyn examination  A Pap smear was performed  She was given information about the intrauterine device for contraception she will let me know her decision

## 2019-01-28 LAB
CLINICAL INFO: NORMAL
CYTO CVX: NORMAL
CYTOLOGY CMNT CVX/VAG CYTO-IMP: NORMAL
DATE PREVIOUS BX: NORMAL
LMP START DATE: NORMAL
SL AMB PREV. PAP:: NORMAL
SPECIMEN SOURCE CVX/VAG CYTO: NORMAL

## 2019-02-07 ENCOUNTER — TELEPHONE (OUTPATIENT)
Dept: OBGYN CLINIC | Facility: CLINIC | Age: 29
End: 2019-02-07

## 2019-02-07 NOTE — TELEPHONE ENCOUNTER
Patient was here recently for annual exam and has decided that she would like to go on birth control pills at this time for contraception  Does she need to come in for another appointment to start them?

## 2019-02-14 DIAGNOSIS — Z30.011 ENCOUNTER FOR INITIAL PRESCRIPTION OF CONTRACEPTIVE PILLS: Primary | ICD-10-CM

## 2019-02-14 RX ORDER — NORGESTIMATE AND ETHINYL ESTRADIOL 0.25-0.035
1 KIT ORAL DAILY
Qty: 84 TABLET | Refills: 2 | Status: SHIPPED | OUTPATIENT
Start: 2019-02-14 | End: 2019-10-15 | Stop reason: SDUPTHER

## 2019-10-15 DIAGNOSIS — Z30.011 ENCOUNTER FOR INITIAL PRESCRIPTION OF CONTRACEPTIVE PILLS: ICD-10-CM

## 2020-06-28 DIAGNOSIS — Z30.011 ENCOUNTER FOR INITIAL PRESCRIPTION OF CONTRACEPTIVE PILLS: ICD-10-CM

## 2020-06-28 DIAGNOSIS — Z30.41 ENCOUNTER FOR SURVEILLANCE OF CONTRACEPTIVE PILLS: Primary | ICD-10-CM

## 2020-06-29 RX ORDER — NORGESTIMATE AND ETHINYL ESTRADIOL 0.25-0.035
1 KIT ORAL DAILY
Qty: 28 TABLET | Refills: 0 | Status: SHIPPED | OUTPATIENT
Start: 2020-06-29 | End: 2020-07-01 | Stop reason: SDUPTHER

## 2020-07-01 ENCOUNTER — TELEPHONE (OUTPATIENT)
Dept: OBGYN CLINIC | Facility: CLINIC | Age: 30
End: 2020-07-01

## 2020-07-01 DIAGNOSIS — Z30.41 ENCOUNTER FOR SURVEILLANCE OF CONTRACEPTIVE PILLS: ICD-10-CM

## 2020-07-01 RX ORDER — NORGESTIMATE AND ETHINYL ESTRADIOL 0.25-0.035
1 KIT ORAL DAILY
Qty: 84 TABLET | Refills: 0 | Status: SHIPPED | OUTPATIENT
Start: 2020-07-01 | End: 2020-09-14 | Stop reason: SDUPTHER

## 2020-07-01 NOTE — TELEPHONE ENCOUNTER
Pt has appointment with you next week 7/9  She will need new prescription for her birth control sent to [facility]  Her insurance denied the 1 month supply  It needs to be 90 days

## 2020-07-13 ENCOUNTER — ANNUAL EXAM (OUTPATIENT)
Dept: OBGYN CLINIC | Facility: CLINIC | Age: 30
End: 2020-07-13
Payer: COMMERCIAL

## 2020-07-13 VITALS
HEIGHT: 62 IN | TEMPERATURE: 99.8 F | WEIGHT: 169.2 LBS | DIASTOLIC BLOOD PRESSURE: 72 MMHG | BODY MASS INDEX: 31.14 KG/M2 | SYSTOLIC BLOOD PRESSURE: 110 MMHG

## 2020-07-13 DIAGNOSIS — B96.89 BV (BACTERIAL VAGINOSIS): ICD-10-CM

## 2020-07-13 DIAGNOSIS — Z01.419 WOMEN'S ANNUAL ROUTINE GYNECOLOGICAL EXAMINATION: Primary | ICD-10-CM

## 2020-07-13 DIAGNOSIS — N76.0 BV (BACTERIAL VAGINOSIS): ICD-10-CM

## 2020-07-13 DIAGNOSIS — N76.0 ACUTE VAGINITIS: ICD-10-CM

## 2020-07-13 DIAGNOSIS — L02.415 ABSCESS OF RIGHT THIGH: ICD-10-CM

## 2020-07-13 PROBLEM — O47.9 UTERINE CONTRACTIONS DURING PREGNANCY: Status: RESOLVED | Noted: 2018-06-04 | Resolved: 2020-07-13

## 2020-07-13 PROBLEM — Z98.891 S/P PRIMARY LOW TRANSVERSE C-SECTION: Status: RESOLVED | Noted: 2018-06-04 | Resolved: 2020-07-13

## 2020-07-13 PROBLEM — Z3A.40 40 WEEKS GESTATION OF PREGNANCY: Status: RESOLVED | Noted: 2018-06-04 | Resolved: 2020-07-13

## 2020-07-13 PROBLEM — Z28.39 RUBELLA NON-IMMUNE STATUS, ANTEPARTUM: Status: RESOLVED | Noted: 2018-06-04 | Resolved: 2020-07-13

## 2020-07-13 PROBLEM — O09.899 RUBELLA NON-IMMUNE STATUS, ANTEPARTUM: Status: RESOLVED | Noted: 2018-06-04 | Resolved: 2020-07-13

## 2020-07-13 PROCEDURE — S0612 ANNUAL GYNECOLOGICAL EXAMINA: HCPCS | Performed by: NURSE PRACTITIONER

## 2020-07-13 RX ORDER — SULFAMETHOXAZOLE AND TRIMETHOPRIM 800; 160 MG/1; MG/1
1 TABLET ORAL EVERY 12 HOURS SCHEDULED
Qty: 14 TABLET | Refills: 0 | Status: SHIPPED | OUTPATIENT
Start: 2020-07-13 | End: 2020-07-20

## 2020-07-13 RX ORDER — METRONIDAZOLE 500 MG/1
500 TABLET ORAL EVERY 12 HOURS SCHEDULED
Qty: 14 TABLET | Refills: 0 | Status: SHIPPED | OUTPATIENT
Start: 2020-07-13 | End: 2020-07-20

## 2020-07-13 RX ORDER — FLUCONAZOLE 100 MG/1
100 TABLET ORAL DAILY
Qty: 2 TABLET | Refills: 0 | Status: SHIPPED | OUTPATIENT
Start: 2020-07-13 | End: 2020-07-15

## 2020-07-13 NOTE — PROGRESS NOTES
Subjective    HPI:     Rony Faulkner is a 31 y o  female  She is a  1 Para 1, with a prior  section  Her menstrual cycles are regular and predictable  Her current method of contraception includes OCP  She denies issues with intimacy  She denies /GI complaints  She is experiencing an abnormal vaginal odor  She feels safe at home  She denies depression/anxiety  Medical, surgical and family history reviewed  Her dental care is up-to-date  She eats a healthy diet and exercises regularly  She is not happy with her weight  Gynecologic History    Patient's last menstrual period was 2020 (exact date)  Last Pap: 2019  Results were: normal    Obstetric History    OB History    Para Term  AB Living   1 1 1 0 0 1   SAB TAB Ectopic Multiple Live Births   0 0 0 0 1      # Outcome Date GA Lbr Ash/2nd Weight Sex Delivery Anes PTL Lv   1 Term 18 40w0d  3480 g (7 lb 10 8 oz) F CS-LTranv EPI N EPHRAIM      Complications: Chorioamnionitis, Failure to Progress in First Stage       The following portions of the patient's history were reviewed and updated as appropriate: allergies, current medications, past family history, past medical history, past social history, past surgical history and problem list     Review of Systems    Pertinent items are noted in HPI  Objective    Physical Exam   Constitutional: Vital signs are normal  She appears well-developed and well-nourished  Genitourinary: Uterus normal  Pelvic exam was performed with patient supine  There is no rash, tenderness, lesion or Bartholin's cyst on the right labia  There is no rash, tenderness, lesion or Bartholin's cyst on the left labia  Vaginal discharge (white malodorous discharge consistenet with BV) found  Right adnexum does not display mass, does not display tenderness and does not display fullness  Left adnexum does not display mass, does not display tenderness and does not display fullness     Cervix is not parous  Cervix does not exhibit motion tenderness, lesion, discharge, friability, polyp or nabothian cyst      Uterus is anteverted  HENT:   Head: Normocephalic and atraumatic  Neck: Neck supple  No thyromegaly present  Cardiovascular: Normal rate, regular rhythm, S1 normal, S2 normal and normal heart sounds  Pulmonary/Chest: Effort normal and breath sounds normal  Right breast exhibits no inverted nipple, no mass, no nipple discharge, no skin change and no tenderness  Left breast exhibits no inverted nipple, no mass, no nipple discharge, no skin change and no tenderness  Abdominal: Soft  Normal appearance and bowel sounds are normal  She exhibits no distension and no mass  There is no tenderness  There is no guarding  Lymphadenopathy:     She has no cervical adenopathy  She has no axillary adenopathy  Neurological: She is alert  Skin: Skin is warm, dry and intact  No rash noted  Psychiatric: She has a normal mood and affect  Nursing note and vitals reviewed  Assessment and Plan    Sona Alfonso was seen today for gynecologic exam     Diagnoses and all orders for this visit:    Women's annual routine gynecological examination    BV (bacterial vaginosis)  -     metroNIDAZOLE (FLAGYL) 500 mg tablet; Take 1 tablet (500 mg total) by mouth every 12 (twelve) hours for 7 days    Abscess of right thigh  -     sulfamethoxazole-trimethoprim (BACTRIM DS) 800-160 mg per tablet; Take 1 tablet by mouth every 12 (twelve) hours for 7 days    Acute vaginitis  -     fluconazole (DIFLUCAN) 100 mg tablet; Take 1 tablet (100 mg total) by mouth daily for 2 days      Patient informed of a Stable GYN exam with the exception of bacterial vaginosis infection noted on exam and right thigh abscess forming  She has a history of MRSA  Will initiate treatment for BV and abscess  In addition will prophylacticly treat for candida as she is prone to yeast infections with use of antibiotics        A pap smear was not performed due to a normal pap in 2019  I have discussed the importance of exercise and healthy diet as well as adequate intake of calcium and vitamin D  The current ASCCP guidelines were reviewed  The low risk patient will receive pap smear screening every 3 years until the age of 34 and then every 3 to 5 years with HPV co-testing from the ages of 33-67  I emphasized the importance of an annual pelvic and breast exam  A yearly mammogram is recommended for breast cancer screening starting at age 36  All questions have been answered to her satisfaction  Follow up in: 1 year

## 2020-09-14 DIAGNOSIS — Z30.41 ENCOUNTER FOR SURVEILLANCE OF CONTRACEPTIVE PILLS: ICD-10-CM

## 2020-09-14 RX ORDER — NORGESTIMATE AND ETHINYL ESTRADIOL 0.25-0.035
1 KIT ORAL DAILY
Qty: 84 TABLET | Refills: 2 | Status: SHIPPED | OUTPATIENT
Start: 2020-09-14 | End: 2021-04-05 | Stop reason: SDUPTHER

## 2020-09-14 NOTE — TELEPHONE ENCOUNTER
Pt seen for yearly - presc for Sprintec escribed but only x 3 months with no refills    Please sign off on presc to [facility])

## 2020-11-23 DIAGNOSIS — N76.0 ACUTE VAGINITIS: Primary | ICD-10-CM

## 2020-11-23 RX ORDER — METRONIDAZOLE 500 MG/1
500 TABLET ORAL EVERY 12 HOURS SCHEDULED
Qty: 14 TABLET | Refills: 0 | Status: SHIPPED | OUTPATIENT
Start: 2020-11-23 | End: 2020-11-30

## 2020-11-23 RX ORDER — FLUCONAZOLE 100 MG/1
100 TABLET ORAL DAILY
Qty: 2 TABLET | Refills: 0 | Status: SHIPPED | OUTPATIENT
Start: 2020-11-23 | End: 2020-11-25

## 2021-01-22 DIAGNOSIS — Z23 ENCOUNTER FOR IMMUNIZATION: ICD-10-CM

## 2021-01-29 ENCOUNTER — IMMUNIZATIONS (OUTPATIENT)
Dept: FAMILY MEDICINE CLINIC | Facility: HOSPITAL | Age: 31
End: 2021-01-29

## 2021-01-29 DIAGNOSIS — Z23 ENCOUNTER FOR IMMUNIZATION: Primary | ICD-10-CM

## 2021-01-29 PROCEDURE — 91301 SARS-COV-2 / COVID-19 MRNA VACCINE (MODERNA) 100 MCG: CPT

## 2021-01-29 PROCEDURE — 0011A SARS-COV-2 / COVID-19 MRNA VACCINE (MODERNA) 100 MCG: CPT

## 2021-02-26 ENCOUNTER — IMMUNIZATIONS (OUTPATIENT)
Dept: FAMILY MEDICINE CLINIC | Facility: HOSPITAL | Age: 31
End: 2021-02-26

## 2021-02-26 DIAGNOSIS — Z23 ENCOUNTER FOR IMMUNIZATION: Primary | ICD-10-CM

## 2021-02-26 PROCEDURE — 91301 SARS-COV-2 / COVID-19 MRNA VACCINE (MODERNA) 100 MCG: CPT

## 2021-02-26 PROCEDURE — 0012A SARS-COV-2 / COVID-19 MRNA VACCINE (MODERNA) 100 MCG: CPT

## 2021-03-17 ENCOUNTER — PATIENT MESSAGE (OUTPATIENT)
Dept: OBGYN CLINIC | Facility: CLINIC | Age: 31
End: 2021-03-17

## 2021-03-19 ENCOUNTER — TELEPHONE (OUTPATIENT)
Dept: OBGYN CLINIC | Facility: CLINIC | Age: 31
End: 2021-03-19

## 2021-03-29 ENCOUNTER — OFFICE VISIT (OUTPATIENT)
Dept: OBGYN CLINIC | Facility: CLINIC | Age: 31
End: 2021-03-29
Payer: COMMERCIAL

## 2021-03-29 VITALS
SYSTOLIC BLOOD PRESSURE: 120 MMHG | BODY MASS INDEX: 27.42 KG/M2 | HEIGHT: 62 IN | WEIGHT: 149 LBS | DIASTOLIC BLOOD PRESSURE: 86 MMHG

## 2021-03-29 DIAGNOSIS — N76.0 ACUTE VAGINITIS: Primary | ICD-10-CM

## 2021-03-29 DIAGNOSIS — Z11.3 SCREENING EXAMINATION FOR STD (SEXUALLY TRANSMITTED DISEASE): ICD-10-CM

## 2021-03-29 PROCEDURE — 87491 CHLMYD TRACH DNA AMP PROBE: CPT | Performed by: NURSE PRACTITIONER

## 2021-03-29 PROCEDURE — 81513 NFCT DS BV RNA VAG FLU ALG: CPT | Performed by: NURSE PRACTITIONER

## 2021-03-29 PROCEDURE — 99213 OFFICE O/P EST LOW 20 MIN: CPT | Performed by: NURSE PRACTITIONER

## 2021-03-29 PROCEDURE — 87591 N.GONORRHOEAE DNA AMP PROB: CPT | Performed by: NURSE PRACTITIONER

## 2021-03-29 PROCEDURE — 3008F BODY MASS INDEX DOCD: CPT | Performed by: NURSE PRACTITIONER

## 2021-03-29 PROCEDURE — 1036F TOBACCO NON-USER: CPT | Performed by: NURSE PRACTITIONER

## 2021-03-29 NOTE — PROGRESS NOTES
SUBJECTIVE:     31 y o  female complains of vaginal odor which comes and goes and a light white discharge  Denies abnormal vaginal bleeding or significant pelvic pain or  fever  No UTI symptoms  She is concerned that her partner may have been unfaithful and would like STD screening  Patient's last menstrual period was 03/15/2021  OBJECTIVE:     She appears well, afebrile  Abdomen: benign, soft, nontender, no masses  Pelvic Exam: normal external genitalia, vulva, vagina, cervix, uterus and adnexa  ASSESSMENT AND PLAN:     [facility] Jennifer was seen today for personal problem      Diagnoses and all orders for this visit:    Acute vaginitis  -     VAGINOSIS DNA PROBE (AFFIRM)    Screening examination for STD (sexually transmitted disease)  -     Chlamydia/GC amplified DNA by PCR

## 2021-03-31 LAB
C TRACH DNA SPEC QL NAA+PROBE: NEGATIVE
CANDIDA RRNA VAG QL PROBE: NEGATIVE
G VAGINALIS RRNA GENITAL QL PROBE: NEGATIVE
N GONORRHOEA DNA SPEC QL NAA+PROBE: NEGATIVE
T VAGINALIS RRNA GENITAL QL PROBE: NEGATIVE

## 2021-04-05 ENCOUNTER — TELEPHONE (OUTPATIENT)
Dept: OBGYN CLINIC | Facility: CLINIC | Age: 31
End: 2021-04-05

## 2021-04-05 DIAGNOSIS — Z30.41 ENCOUNTER FOR SURVEILLANCE OF CONTRACEPTIVE PILLS: ICD-10-CM

## 2021-04-05 NOTE — TELEPHONE ENCOUNTER
----- Message from Chang Conde sent at 4/5/2021 10:00 AM EDT -----  Regarding: Non-Urgent Medical Question  Contact: [phone]  Urban Thao,    I had just been there last week and I see everything came back negative, thankfully; however shortly after I had a new issue  I have a wrong last day period- last day was 3/8, last Tuesday 3/30 I began spotting which I then realized was my period a week early while still on week 3 of my birth control pills  I still have my period now (4/5)  what does this mean for my pills? This week was supposed to be the inactive      Thanks again,    [employer]

## 2021-04-05 NOTE — TELEPHONE ENCOUNTER
pt changed to different generic for [address] [place] [address] Rd - in 1/2021 - pharmacy changed  - please sign off on presc for [facility] - pt due to start new pill pack on 4/11/2021    [facility]

## 2021-06-18 ENCOUNTER — HOSPITAL ENCOUNTER (OUTPATIENT)
Facility: HOSPITAL | Age: 31
Setting detail: OUTPATIENT SURGERY
Discharge: HOME | End: 2021-06-19
Attending: EMERGENCY MEDICINE | Admitting: SURGERY
Payer: COMMERCIAL

## 2021-06-18 ENCOUNTER — APPOINTMENT (EMERGENCY)
Dept: ULTRASOUND IMAGING | Facility: HOSPITAL | Age: 31
End: 2021-06-18
Payer: COMMERCIAL

## 2021-06-18 ENCOUNTER — ANESTHESIA EVENT (OUTPATIENT)
Dept: PERIOP | Facility: HOSPITAL | Age: 31
End: 2021-06-18
Payer: COMMERCIAL

## 2021-06-18 ENCOUNTER — APPOINTMENT (EMERGENCY)
Dept: CT IMAGING | Facility: HOSPITAL | Age: 31
End: 2021-06-18
Payer: COMMERCIAL

## 2021-06-18 ENCOUNTER — OFFICE VISIT (OUTPATIENT)
Dept: URGENT CARE | Age: 31
End: 2021-06-18
Payer: COMMERCIAL

## 2021-06-18 VITALS
HEART RATE: 90 BPM | SYSTOLIC BLOOD PRESSURE: 127 MMHG | OXYGEN SATURATION: 99 % | RESPIRATION RATE: 18 BRPM | TEMPERATURE: 98.1 F | DIASTOLIC BLOOD PRESSURE: 71 MMHG

## 2021-06-18 DIAGNOSIS — K81.0 ACUTE CHOLECYSTITIS: Primary | ICD-10-CM

## 2021-06-18 DIAGNOSIS — R10.11 RIGHT UPPER QUADRANT ABDOMINAL PAIN: Primary | ICD-10-CM

## 2021-06-18 LAB
ALBUMIN SERPL BCP-MCNC: 3.7 G/DL (ref 3.5–5)
ALP SERPL-CCNC: 59 U/L (ref 46–116)
ALT SERPL W P-5'-P-CCNC: 21 U/L (ref 12–78)
ANION GAP SERPL CALCULATED.3IONS-SCNC: 10 MMOL/L (ref 4–13)
AST SERPL W P-5'-P-CCNC: 17 U/L (ref 5–45)
BACTERIA UR QL AUTO: ABNORMAL /HPF
BASOPHILS # BLD AUTO: 0.04 THOUSANDS/ΜL (ref 0–0.1)
BASOPHILS NFR BLD AUTO: 0 % (ref 0–1)
BILIRUB SERPL-MCNC: 0.42 MG/DL (ref 0.2–1)
BILIRUB UR QL STRIP: NEGATIVE
BUN SERPL-MCNC: 13 MG/DL (ref 5–25)
CALCIUM SERPL-MCNC: 9.1 MG/DL (ref 8.3–10.1)
CHLORIDE SERPL-SCNC: 105 MMOL/L (ref 100–108)
CLARITY UR: CLEAR
CO2 SERPL-SCNC: 26 MMOL/L (ref 21–32)
COLOR UR: ABNORMAL
CREAT SERPL-MCNC: 0.59 MG/DL (ref 0.6–1.3)
EOSINOPHIL # BLD AUTO: 0.12 THOUSAND/ΜL (ref 0–0.61)
EOSINOPHIL NFR BLD AUTO: 1 % (ref 0–6)
ERYTHROCYTE [DISTWIDTH] IN BLOOD BY AUTOMATED COUNT: 12 % (ref 11.6–15.1)
EXT PREG TEST URINE: NEGATIVE
EXT. CONTROL ED NAV: NORMAL
GFR SERPL CREATININE-BSD FRML MDRD: 122 ML/MIN/1.73SQ M
GLUCOSE SERPL-MCNC: 92 MG/DL (ref 65–140)
GLUCOSE UR STRIP-MCNC: NEGATIVE MG/DL
HCT VFR BLD AUTO: 43.5 % (ref 34.8–46.1)
HGB BLD-MCNC: 14.3 G/DL (ref 11.5–15.4)
HGB UR QL STRIP.AUTO: ABNORMAL
IMM GRANULOCYTES # BLD AUTO: 0.03 THOUSAND/UL (ref 0–0.2)
IMM GRANULOCYTES NFR BLD AUTO: 0 % (ref 0–2)
KETONES UR STRIP-MCNC: NEGATIVE MG/DL
LEUKOCYTE ESTERASE UR QL STRIP: NEGATIVE
LIPASE SERPL-CCNC: 92 U/L (ref 73–393)
LYMPHOCYTES # BLD AUTO: 2.41 THOUSANDS/ΜL (ref 0.6–4.47)
LYMPHOCYTES NFR BLD AUTO: 25 % (ref 14–44)
MCH RBC QN AUTO: 30.6 PG (ref 26.8–34.3)
MCHC RBC AUTO-ENTMCNC: 32.9 G/DL (ref 31.4–37.4)
MCV RBC AUTO: 93 FL (ref 82–98)
MONOCYTES # BLD AUTO: 0.57 THOUSAND/ΜL (ref 0.17–1.22)
MONOCYTES NFR BLD AUTO: 6 % (ref 4–12)
MUCOUS THREADS UR QL AUTO: ABNORMAL
NEUTROPHILS # BLD AUTO: 6.68 THOUSANDS/ΜL (ref 1.85–7.62)
NEUTS SEG NFR BLD AUTO: 68 % (ref 43–75)
NITRITE UR QL STRIP: NEGATIVE
NON-SQ EPI CELLS URNS QL MICRO: ABNORMAL /HPF
NRBC BLD AUTO-RTO: 0 /100 WBCS
PH UR STRIP.AUTO: 5.5 [PH] (ref 4.5–8)
PLATELET # BLD AUTO: 350 THOUSANDS/UL (ref 149–390)
PMV BLD AUTO: 9.6 FL (ref 8.9–12.7)
POTASSIUM SERPL-SCNC: 3.9 MMOL/L (ref 3.5–5.3)
PROT SERPL-MCNC: 7.7 G/DL (ref 6.4–8.2)
PROT UR STRIP-MCNC: NEGATIVE MG/DL
RBC # BLD AUTO: 4.67 MILLION/UL (ref 3.81–5.12)
RBC #/AREA URNS AUTO: ABNORMAL /HPF
SODIUM SERPL-SCNC: 141 MMOL/L (ref 136–145)
SP GR UR STRIP.AUTO: >=1.03 (ref 1–1.03)
UROBILINOGEN UR QL STRIP.AUTO: 0.2 E.U./DL
WBC # BLD AUTO: 9.85 THOUSAND/UL (ref 4.31–10.16)
WBC #/AREA URNS AUTO: ABNORMAL /HPF

## 2021-06-18 PROCEDURE — 99219 PR INITIAL OBSERVATION CARE/DAY 50 MINUTES: CPT | Performed by: PHYSICIAN ASSISTANT

## 2021-06-18 PROCEDURE — G0382 LEV 3 HOSP TYPE B ED VISIT: HCPCS | Performed by: PHYSICIAN ASSISTANT

## 2021-06-18 PROCEDURE — 85025 COMPLETE CBC W/AUTO DIFF WBC: CPT | Performed by: EMERGENCY MEDICINE

## 2021-06-18 PROCEDURE — 81025 URINE PREGNANCY TEST: CPT | Performed by: EMERGENCY MEDICINE

## 2021-06-18 PROCEDURE — 76700 US EXAM ABDOM COMPLETE: CPT

## 2021-06-18 PROCEDURE — 99285 EMERGENCY DEPT VISIT HI MDM: CPT

## 2021-06-18 PROCEDURE — 96361 HYDRATE IV INFUSION ADD-ON: CPT

## 2021-06-18 PROCEDURE — 81001 URINALYSIS AUTO W/SCOPE: CPT

## 2021-06-18 PROCEDURE — 36415 COLL VENOUS BLD VENIPUNCTURE: CPT

## 2021-06-18 PROCEDURE — S9083 URGENT CARE CENTER GLOBAL: HCPCS | Performed by: PHYSICIAN ASSISTANT

## 2021-06-18 PROCEDURE — 96374 THER/PROPH/DIAG INJ IV PUSH: CPT

## 2021-06-18 PROCEDURE — 80053 COMPREHEN METABOLIC PANEL: CPT | Performed by: EMERGENCY MEDICINE

## 2021-06-18 PROCEDURE — 83690 ASSAY OF LIPASE: CPT | Performed by: EMERGENCY MEDICINE

## 2021-06-18 PROCEDURE — 96375 TX/PRO/DX INJ NEW DRUG ADDON: CPT

## 2021-06-18 PROCEDURE — 99285 EMERGENCY DEPT VISIT HI MDM: CPT | Performed by: EMERGENCY MEDICINE

## 2021-06-18 PROCEDURE — 74177 CT ABD & PELVIS W/CONTRAST: CPT

## 2021-06-18 RX ORDER — SODIUM CHLORIDE, SODIUM LACTATE, POTASSIUM CHLORIDE, CALCIUM CHLORIDE 600; 310; 30; 20 MG/100ML; MG/100ML; MG/100ML; MG/100ML
100 INJECTION, SOLUTION INTRAVENOUS CONTINUOUS
Status: DISCONTINUED | OUTPATIENT
Start: 2021-06-18 | End: 2021-06-19 | Stop reason: HOSPADM

## 2021-06-18 RX ORDER — ONDANSETRON 2 MG/ML
4 INJECTION INTRAMUSCULAR; INTRAVENOUS ONCE
Status: COMPLETED | OUTPATIENT
Start: 2021-06-18 | End: 2021-06-18

## 2021-06-18 RX ORDER — ONDANSETRON 2 MG/ML
4 INJECTION INTRAMUSCULAR; INTRAVENOUS EVERY 6 HOURS PRN
Status: DISCONTINUED | OUTPATIENT
Start: 2021-06-18 | End: 2021-06-19

## 2021-06-18 RX ORDER — ACETAMINOPHEN 325 MG/1
650 TABLET ORAL EVERY 6 HOURS PRN
Status: DISCONTINUED | OUTPATIENT
Start: 2021-06-18 | End: 2021-06-19 | Stop reason: HOSPADM

## 2021-06-18 RX ORDER — OXYCODONE HYDROCHLORIDE 5 MG/1
5 TABLET ORAL EVERY 4 HOURS PRN
Status: DISCONTINUED | OUTPATIENT
Start: 2021-06-18 | End: 2021-06-19 | Stop reason: HOSPADM

## 2021-06-18 RX ORDER — IBUPROFEN 600 MG/1
600 TABLET ORAL EVERY 6 HOURS PRN
Status: DISCONTINUED | OUTPATIENT
Start: 2021-06-18 | End: 2021-06-19 | Stop reason: HOSPADM

## 2021-06-18 RX ORDER — HYDROMORPHONE HCL/PF 1 MG/ML
0.2 SYRINGE (ML) INJECTION EVERY 4 HOURS PRN
Status: DISCONTINUED | OUTPATIENT
Start: 2021-06-18 | End: 2021-06-19 | Stop reason: HOSPADM

## 2021-06-18 RX ORDER — OXYCODONE HYDROCHLORIDE 10 MG/1
10 TABLET ORAL EVERY 4 HOURS PRN
Status: DISCONTINUED | OUTPATIENT
Start: 2021-06-18 | End: 2021-06-19 | Stop reason: HOSPADM

## 2021-06-18 RX ORDER — CEFAZOLIN SODIUM 2 G/50ML
2000 SOLUTION INTRAVENOUS EVERY 8 HOURS
Status: DISCONTINUED | OUTPATIENT
Start: 2021-06-18 | End: 2021-06-19 | Stop reason: HOSPADM

## 2021-06-18 RX ADMIN — CEFAZOLIN SODIUM 2000 MG: 2 SOLUTION INTRAVENOUS at 20:41

## 2021-06-18 RX ADMIN — ACETAMINOPHEN 650 MG: 325 TABLET ORAL at 21:00

## 2021-06-18 RX ADMIN — IOHEXOL 100 ML: 350 INJECTION, SOLUTION INTRAVENOUS at 18:39

## 2021-06-18 RX ADMIN — IBUPROFEN 600 MG: 600 TABLET ORAL at 23:26

## 2021-06-18 RX ADMIN — SODIUM CHLORIDE, SODIUM LACTATE, POTASSIUM CHLORIDE, AND CALCIUM CHLORIDE 100 ML/HR: .6; .31; .03; .02 INJECTION, SOLUTION INTRAVENOUS at 21:42

## 2021-06-18 RX ADMIN — ONDANSETRON 4 MG: 2 INJECTION INTRAMUSCULAR; INTRAVENOUS at 15:43

## 2021-06-18 RX ADMIN — SODIUM CHLORIDE 1000 ML: 0.9 INJECTION, SOLUTION INTRAVENOUS at 15:44

## 2021-06-18 RX ADMIN — PIPERACILLIN AND TAZOBACTAM 3.38 G: 3; .375 INJECTION, POWDER, LYOPHILIZED, FOR SOLUTION INTRAVENOUS at 20:21

## 2021-06-18 RX ADMIN — METRONIDAZOLE 500 MG: 500 INJECTION, SOLUTION INTRAVENOUS at 20:57

## 2021-06-18 RX ADMIN — MORPHINE SULFATE 2 MG: 2 INJECTION, SOLUTION INTRAMUSCULAR; INTRAVENOUS at 15:43

## 2021-06-18 NOTE — PROGRESS NOTES
[facility]        NAME: Ivana Lazo is a 27 y o  female  : 1990    MRN: [id number]  DATE: 2021  TIME: 10:51 AM    Assessment and Plan   Right upper quadrant abdominal pain [R10 11]  1  Right upper quadrant abdominal pain  Transfer to other facility         Patient Instructions       Follow up with PCP in 3-5 days  Proceed to  ER if symptoms worsen  Chief Complaint     Chief Complaint   Patient presents with    Abdominal Pain     C/O INTERMITTENT PAIN UNDER RIB AREA , WITH INTERMITTENT  NAUSEA , INTERMITTENT PAIN WRAPS AROUND B/L RIBS ,"LIKE A TIGHT BAND", PAIN WORSENING AT NIGHT , WHEN LAYING DOWN   DENIES VOMITING AND DIARRHEA  History of Present Illness         For the past month patient has had on and off right upper quadrant pain in the middle of the night however the past 2 nights it has woken her up and taking hours to go away  She states she is starting to experience it during the daytime now 2  She is unsure if it is exposed stated with anything she is eating  She had some nausea this week with 1 of the episodes  She denies any vomiting  She denies any other symptoms  She does still have her gallbladder  Review of Systems   Review of Systems   Constitutional: Negative  HENT: Negative  Respiratory: Negative  Cardiovascular: Negative  Gastrointestinal: Positive for abdominal pain and nausea  Negative for constipation, diarrhea and vomiting  Musculoskeletal: Negative  Neurological: Negative  Psychiatric/Behavioral: Negative            Current Medications       Current Outpatient Medications:     Sprintec 28 0 25-35 MG-MCG per tablet, Take 1 tablet by mouth daily PLEASE GIVE SPECIFIC GENERIC SPRINTEC PLEASE CANCEL REMAINING RFS ON PREVIOUS PRESC, Disp: 28 tablet, Rfl: 4    Current Allergies     Allergies as of 2021 - Reviewed 2021   Allergen Reaction Noted    Nitrofurantoin Anaphylaxis             The following portions of the patient's history were reviewed and updated as appropriate: allergies, current medications, past family history, past medical history, past social history, past surgical history and problem list      Past Medical History:   Diagnosis Date    HPV in female     Seasonal allergies     Urinary tract infection     Varicella     childhood       Past Surgical History:   Procedure Laterality Date    COLONOSCOPY W/ BIOPSIES      [place]  DELIVERY ONLY N/A 2018    Procedure:  SECTION (); Surgeon: Latisha Colon MD;  Location: [facility];  Service: Obstetrics    WISDOM TOOTH EXTRACTION         Family History   Problem Relation Age of Onset    Sickle cell trait Other     Factor V Leiden deficiency Father     Arthritis Maternal Grandmother     Depression Maternal Grandmother     Miscarriages / Stillbirths Maternal Grandmother     Arthritis Maternal Grandfather     Cancer Maternal Grandfather         prostate         Medications have been verified  Objective   /71   Pulse 90   Temp 98 1 °F (36 7 °C)   Resp 18   LMP 2021   SpO2 99%        Physical Exam     Physical Exam  Vitals and nursing note reviewed  Constitutional:       General: She is not in acute distress  Appearance: Normal appearance  She is not ill-appearing or toxic-appearing  Cardiovascular:      Rate and Rhythm: Normal rate and regular rhythm  Pulses: Normal pulses  Heart sounds: Normal heart sounds  Pulmonary:      Effort: Pulmonary effort is normal       Breath sounds: Normal breath sounds  No wheezing  Abdominal:      General: Abdomen is flat  Bowel sounds are normal  There is no distension  Palpations: Abdomen is soft  Tenderness: There is abdominal tenderness in the right upper quadrant  There is no right CVA tenderness, left CVA tenderness, guarding or rebound  Negative signs include Saha's sign, Rovsing's sign, McBurney's sign and psoas sign     Skin:     General: Skin is warm and dry  Neurological:      General: No focal deficit present  Mental Status: She is alert and oriented to person, place, and time     Psychiatric:         Mood and Affect: Mood normal          Behavior: Behavior normal

## 2021-06-19 ENCOUNTER — ANESTHESIA (OUTPATIENT)
Dept: PERIOP | Facility: HOSPITAL | Age: 31
End: 2021-06-19
Payer: COMMERCIAL

## 2021-06-19 VITALS
HEART RATE: 69 BPM | TEMPERATURE: 97.1 F | OXYGEN SATURATION: 98 % | SYSTOLIC BLOOD PRESSURE: 125 MMHG | RESPIRATION RATE: 20 BRPM | DIASTOLIC BLOOD PRESSURE: 69 MMHG

## 2021-06-19 LAB
ALBUMIN SERPL BCP-MCNC: 2.8 G/DL (ref 3.5–5)
ALP SERPL-CCNC: 57 U/L (ref 46–116)
ALT SERPL W P-5'-P-CCNC: 28 U/L (ref 12–78)
ANION GAP SERPL CALCULATED.3IONS-SCNC: 8 MMOL/L (ref 4–13)
AST SERPL W P-5'-P-CCNC: 20 U/L (ref 5–45)
BASOPHILS # BLD AUTO: 0.04 THOUSANDS/ΜL (ref 0–0.1)
BASOPHILS NFR BLD AUTO: 1 % (ref 0–1)
BILIRUB SERPL-MCNC: 0.71 MG/DL (ref 0.2–1)
BUN SERPL-MCNC: 8 MG/DL (ref 5–25)
CALCIUM ALBUM COR SERPL-MCNC: 8.9 MG/DL (ref 8.3–10.1)
CALCIUM SERPL-MCNC: 7.9 MG/DL (ref 8.3–10.1)
CHLORIDE SERPL-SCNC: 108 MMOL/L (ref 100–108)
CO2 SERPL-SCNC: 25 MMOL/L (ref 21–32)
CREAT SERPL-MCNC: 0.56 MG/DL (ref 0.6–1.3)
EOSINOPHIL # BLD AUTO: 0.1 THOUSAND/ΜL (ref 0–0.61)
EOSINOPHIL NFR BLD AUTO: 1 % (ref 0–6)
ERYTHROCYTE [DISTWIDTH] IN BLOOD BY AUTOMATED COUNT: 11.9 % (ref 11.6–15.1)
GFR SERPL CREATININE-BSD FRML MDRD: 124 ML/MIN/1.73SQ M
GLUCOSE SERPL-MCNC: 97 MG/DL (ref 65–140)
HCT VFR BLD AUTO: 38.1 % (ref 34.8–46.1)
HGB BLD-MCNC: 12.4 G/DL (ref 11.5–15.4)
IMM GRANULOCYTES # BLD AUTO: 0.02 THOUSAND/UL (ref 0–0.2)
IMM GRANULOCYTES NFR BLD AUTO: 0 % (ref 0–2)
LYMPHOCYTES # BLD AUTO: 2.88 THOUSANDS/ΜL (ref 0.6–4.47)
LYMPHOCYTES NFR BLD AUTO: 36 % (ref 14–44)
MCH RBC QN AUTO: 30.3 PG (ref 26.8–34.3)
MCHC RBC AUTO-ENTMCNC: 32.5 G/DL (ref 31.4–37.4)
MCV RBC AUTO: 93 FL (ref 82–98)
MONOCYTES # BLD AUTO: 0.68 THOUSAND/ΜL (ref 0.17–1.22)
MONOCYTES NFR BLD AUTO: 8 % (ref 4–12)
NEUTROPHILS # BLD AUTO: 4.33 THOUSANDS/ΜL (ref 1.85–7.62)
NEUTS SEG NFR BLD AUTO: 54 % (ref 43–75)
NRBC BLD AUTO-RTO: 0 /100 WBCS
PLATELET # BLD AUTO: 316 THOUSANDS/UL (ref 149–390)
PMV BLD AUTO: 9.7 FL (ref 8.9–12.7)
POTASSIUM SERPL-SCNC: 3.8 MMOL/L (ref 3.5–5.3)
PROT SERPL-MCNC: 6 G/DL (ref 6.4–8.2)
RBC # BLD AUTO: 4.09 MILLION/UL (ref 3.81–5.12)
SODIUM SERPL-SCNC: 141 MMOL/L (ref 136–145)
WBC # BLD AUTO: 8.05 THOUSAND/UL (ref 4.31–10.16)

## 2021-06-19 PROCEDURE — 80053 COMPREHEN METABOLIC PANEL: CPT | Performed by: PHYSICIAN ASSISTANT

## 2021-06-19 PROCEDURE — 88304 TISSUE EXAM BY PATHOLOGIST: CPT | Performed by: PATHOLOGY

## 2021-06-19 PROCEDURE — 85025 COMPLETE CBC W/AUTO DIFF WBC: CPT | Performed by: PHYSICIAN ASSISTANT

## 2021-06-19 PROCEDURE — 99024 POSTOP FOLLOW-UP VISIT: CPT | Performed by: SURGERY

## 2021-06-19 PROCEDURE — 36415 COLL VENOUS BLD VENIPUNCTURE: CPT | Performed by: PHYSICIAN ASSISTANT

## 2021-06-19 PROCEDURE — 47562 LAPAROSCOPIC CHOLECYSTECTOMY: CPT | Performed by: SURGERY

## 2021-06-19 RX ORDER — OXYCODONE HYDROCHLORIDE 5 MG/1
5 TABLET ORAL EVERY 4 HOURS PRN
Status: DISCONTINUED | OUTPATIENT
Start: 2021-06-19 | End: 2021-06-19 | Stop reason: HOSPADM

## 2021-06-19 RX ORDER — MIDAZOLAM HYDROCHLORIDE 2 MG/2ML
INJECTION, SOLUTION INTRAMUSCULAR; INTRAVENOUS AS NEEDED
Status: DISCONTINUED | OUTPATIENT
Start: 2021-06-19 | End: 2021-06-19

## 2021-06-19 RX ORDER — ALBUTEROL SULFATE 2.5 MG/3ML
2.5 SOLUTION RESPIRATORY (INHALATION) ONCE AS NEEDED
Status: DISCONTINUED | OUTPATIENT
Start: 2021-06-19 | End: 2021-06-19 | Stop reason: HOSPADM

## 2021-06-19 RX ORDER — ONDANSETRON 2 MG/ML
INJECTION INTRAMUSCULAR; INTRAVENOUS AS NEEDED
Status: DISCONTINUED | OUTPATIENT
Start: 2021-06-19 | End: 2021-06-19

## 2021-06-19 RX ORDER — MEPERIDINE HYDROCHLORIDE 25 MG/ML
12.5 INJECTION INTRAMUSCULAR; INTRAVENOUS; SUBCUTANEOUS ONCE AS NEEDED
Status: DISCONTINUED | OUTPATIENT
Start: 2021-06-19 | End: 2021-06-19 | Stop reason: HOSPADM

## 2021-06-19 RX ORDER — LIDOCAINE HYDROCHLORIDE 10 MG/ML
INJECTION, SOLUTION EPIDURAL; INFILTRATION; INTRACAUDAL; PERINEURAL AS NEEDED
Status: DISCONTINUED | OUTPATIENT
Start: 2021-06-19 | End: 2021-06-19

## 2021-06-19 RX ORDER — HYDROMORPHONE HCL/PF 1 MG/ML
0.5 SYRINGE (ML) INJECTION
Status: ACTIVE | OUTPATIENT
Start: 2021-06-19 | End: 2021-06-19

## 2021-06-19 RX ORDER — DEXAMETHASONE SODIUM PHOSPHATE 4 MG/ML
INJECTION, SOLUTION INTRA-ARTICULAR; INTRALESIONAL; INTRAMUSCULAR; INTRAVENOUS; SOFT TISSUE AS NEEDED
Status: DISCONTINUED | OUTPATIENT
Start: 2021-06-19 | End: 2021-06-19

## 2021-06-19 RX ORDER — BUPIVACAINE HYDROCHLORIDE AND EPINEPHRINE 2.5; 5 MG/ML; UG/ML
INJECTION, SOLUTION EPIDURAL; INFILTRATION; INTRACAUDAL; PERINEURAL AS NEEDED
Status: DISCONTINUED | OUTPATIENT
Start: 2021-06-19 | End: 2021-06-19 | Stop reason: HOSPADM

## 2021-06-19 RX ORDER — ONDANSETRON 2 MG/ML
4 INJECTION INTRAMUSCULAR; INTRAVENOUS ONCE AS NEEDED
Status: DISCONTINUED | OUTPATIENT
Start: 2021-06-19 | End: 2021-06-19 | Stop reason: HOSPADM

## 2021-06-19 RX ORDER — FENTANYL CITRATE/PF 50 MCG/ML
50 SYRINGE (ML) INJECTION ONCE AS NEEDED
Status: DISCONTINUED | OUTPATIENT
Start: 2021-06-19 | End: 2021-06-19 | Stop reason: HOSPADM

## 2021-06-19 RX ORDER — SODIUM CHLORIDE, SODIUM LACTATE, POTASSIUM CHLORIDE, CALCIUM CHLORIDE 600; 310; 30; 20 MG/100ML; MG/100ML; MG/100ML; MG/100ML
125 INJECTION, SOLUTION INTRAVENOUS CONTINUOUS
Status: DISCONTINUED | OUTPATIENT
Start: 2021-06-19 | End: 2021-06-19 | Stop reason: HOSPADM

## 2021-06-19 RX ORDER — MORPHINE SULFATE 4 MG/ML
4 INJECTION, SOLUTION INTRAMUSCULAR; INTRAVENOUS
Status: DISCONTINUED | OUTPATIENT
Start: 2021-06-19 | End: 2021-06-19 | Stop reason: HOSPADM

## 2021-06-19 RX ORDER — KETOROLAC TROMETHAMINE 30 MG/ML
INJECTION, SOLUTION INTRAMUSCULAR; INTRAVENOUS AS NEEDED
Status: DISCONTINUED | OUTPATIENT
Start: 2021-06-19 | End: 2021-06-19

## 2021-06-19 RX ORDER — ONDANSETRON 2 MG/ML
4 INJECTION INTRAMUSCULAR; INTRAVENOUS EVERY 4 HOURS PRN
Status: DISCONTINUED | OUTPATIENT
Start: 2021-06-19 | End: 2021-06-19 | Stop reason: HOSPADM

## 2021-06-19 RX ORDER — ROCURONIUM BROMIDE 10 MG/ML
INJECTION, SOLUTION INTRAVENOUS AS NEEDED
Status: DISCONTINUED | OUTPATIENT
Start: 2021-06-19 | End: 2021-06-19

## 2021-06-19 RX ORDER — PROPOFOL 10 MG/ML
INJECTION, EMULSION INTRAVENOUS AS NEEDED
Status: DISCONTINUED | OUTPATIENT
Start: 2021-06-19 | End: 2021-06-19

## 2021-06-19 RX ORDER — FENTANYL CITRATE 50 UG/ML
INJECTION, SOLUTION INTRAMUSCULAR; INTRAVENOUS AS NEEDED
Status: DISCONTINUED | OUTPATIENT
Start: 2021-06-19 | End: 2021-06-19

## 2021-06-19 RX ORDER — OXYCODONE HYDROCHLORIDE 5 MG/1
5 TABLET ORAL EVERY 4 HOURS PRN
Qty: 10 TABLET | Refills: 0 | Status: SHIPPED | OUTPATIENT
Start: 2021-06-19 | End: 2021-06-29

## 2021-06-19 RX ORDER — MAGNESIUM HYDROXIDE 1200 MG/15ML
LIQUID ORAL AS NEEDED
Status: DISCONTINUED | OUTPATIENT
Start: 2021-06-19 | End: 2021-06-19 | Stop reason: HOSPADM

## 2021-06-19 RX ADMIN — PROPOFOL 200 MG: 10 INJECTION, EMULSION INTRAVENOUS at 09:48

## 2021-06-19 RX ADMIN — FENTANYL CITRATE 100 MCG: 50 INJECTION, SOLUTION INTRAMUSCULAR; INTRAVENOUS at 10:05

## 2021-06-19 RX ADMIN — DEXAMETHASONE SODIUM PHOSPHATE 4 MG: 4 INJECTION INTRA-ARTICULAR; INTRALESIONAL; INTRAMUSCULAR; INTRAVENOUS; SOFT TISSUE at 09:50

## 2021-06-19 RX ADMIN — HYDROMORPHONE HYDROCHLORIDE 0.5 MG: 1 INJECTION, SOLUTION INTRAMUSCULAR; INTRAVENOUS; SUBCUTANEOUS at 11:23

## 2021-06-19 RX ADMIN — CEFAZOLIN SODIUM 2000 MG: 2 SOLUTION INTRAVENOUS at 09:57

## 2021-06-19 RX ADMIN — FENTANYL CITRATE 100 MCG: 50 INJECTION, SOLUTION INTRAMUSCULAR; INTRAVENOUS at 09:48

## 2021-06-19 RX ADMIN — IBUPROFEN 600 MG: 600 TABLET ORAL at 12:04

## 2021-06-19 RX ADMIN — LIDOCAINE HYDROCHLORIDE 50 MG: 10 INJECTION, SOLUTION EPIDURAL; INFILTRATION; INTRACAUDAL at 09:48

## 2021-06-19 RX ADMIN — KETOROLAC TROMETHAMINE 30 MG: 30 INJECTION, SOLUTION INTRAMUSCULAR at 10:27

## 2021-06-19 RX ADMIN — METRONIDAZOLE 500 MG: 500 INJECTION, SOLUTION INTRAVENOUS at 03:50

## 2021-06-19 RX ADMIN — ROCURONIUM BROMIDE 40 MG: 10 SOLUTION INTRAVENOUS at 09:48

## 2021-06-19 RX ADMIN — MIDAZOLAM HYDROCHLORIDE 2 MG: 1 INJECTION, SOLUTION INTRAMUSCULAR; INTRAVENOUS at 09:44

## 2021-06-19 RX ADMIN — FENTANYL CITRATE 50 MCG: 50 INJECTION INTRAMUSCULAR; INTRAVENOUS at 11:07

## 2021-06-19 RX ADMIN — ONDANSETRON 4 MG: 2 INJECTION INTRAMUSCULAR; INTRAVENOUS at 09:50

## 2021-06-19 RX ADMIN — SODIUM CHLORIDE, SODIUM LACTATE, POTASSIUM CHLORIDE, AND CALCIUM CHLORIDE: .6; .31; .03; .02 INJECTION, SOLUTION INTRAVENOUS at 10:14

## 2021-06-19 RX ADMIN — CEFAZOLIN SODIUM 2000 MG: 2 SOLUTION INTRAVENOUS at 04:41

## 2021-06-19 RX ADMIN — SUGAMMADEX 200 MG: 100 INJECTION, SOLUTION INTRAVENOUS at 10:27

## 2021-06-19 RX ADMIN — FENTANYL CITRATE 50 MCG: 50 INJECTION INTRAMUSCULAR; INTRAVENOUS at 11:00

## 2021-06-19 NOTE — OP NOTE
OPERATIVE REPORT  PATIENT NAME: Efrain Valverde    :  1990  MRN: [id number]  Pt Location: AN OR ROOM 04    SURGERY DATE: 2021    Surgeon(s) and Role:     * Leigha Carrero DO - Primary     * Ramila Martinez MD - Assisting    Preop Diagnosis:  Acute cholecystitis [K81 0]  calculous    Post-Op Diagnosis Codes:     * Acute cholecystitis [K81 0], calculous    Procedure(s) (LRB):  CHOLECYSTECTOMY LAPAROSCOPIC (N/A)    Specimen(s):  ID Type Source Tests Collected by Time Destination   1 :  Tissue Gallbladder TISSUE EXAM Leigha Carrero DO 2021 1003        Estimated Blood Loss:   Minimal    Drains:  * No LDAs found *    Anesthesia Type:   General/local    Operative Indications:  Acute cholecystitis [K81 0]  Stone lodged in the neck of the gallbladder    Operative Findings:  Changes consistent with acute calculous cholecystitis  Height 62 in weight 67 kg/149 lb  BMI 27  ASA 1  Wound class 2    Complications:   None    Procedure and Technique:  Patient was brought the operative suite and identified by visualization, conversation, by armband  Sequential compression pumps were placed  She was given Ancef perioperatively  Once under anesthesia abdomen is then prepped and draped in a sterile fashion  Time-out was performed was assured that the prep was dry  Local was instilled the infraumbilical fold  Small vertical skin incision was made in the subcutaneous tissues were bluntly dissected with Kocher clamps down the fascia  Fascia lifted up and divided the midline  Poked through the underlying peritoneum gaining access into the abdominal cavity  11 mm trocar was placed under direct visualization  CO2 was attached creating pneumoperitoneum 3 other 5 mm bladeless trocars then placed in the right upper quadrant  The gallbladder is noted to be distended and somewhat tense  It was lifted cephalad    Omental adhesions up to the neck were carefully stripped away with blunt dissection use of the Harmonic scalpel  Could feel a stone in the neck of the gallbladder  Neck was retracted laterally and the cystic duct and cystic artery structures were carefully dissected out with blunt dissection  The short cystic artery coming off of visible right hepatic artery  Both were carefully divided to Ligaclips  Gallbladder was then taken off the liver using variable speed Harmonic scalpel  There was excellent hemostasis  Irrigation was carried out  Gallbladder was placed into an Endo-Catch bag  Pneumoperitoneum was evacuated  Gallbladder was brought to the umbilical port site  Trocars removed  Fascia at the umbilical port site was closed using 0 Vicryl in a figure-eight fashion x2  More local was instilled  Four Monocryl was used to close skin incisions in a subcuticular fashion  Wounds washed and dried  Sterile skin glue was applied  She was awake in the operating returned to the recovery area in stable condition having tolerated procedure well     I was present for the entire procedure    Patient Disposition:  PACU     SIGNATURE: Willian Woods DO  DATE: June 19, 2021  TIME: 10:41 AM

## 2021-06-19 NOTE — ED NOTES
Aware awaiting admit bed and npo, call bell within reach, bed low position     Belkys Julien, [facility]  06/19/21 [id number]

## 2021-06-19 NOTE — PROGRESS NOTES
Progress Note - General Surgery   Nacho Kidney 27 y o  female MRN: [id number]  Unit/Bed#: ED 26 Encounter: [id number]    Assessment:  18-year-old female with concerns for acute cholecystitis, stone in gallbladder neck    Plan:  -continue IV antibiotics  -NPO  -plan for OR for laparoscopic cholecystectomy  Rationale for surgery as well as risks including damage to nearby structures such as bile duct were discussed in detail the patient she verbalized understanding  Subjective/Objective       Subjective:   Patient seen and examined at bedside  No acute events overnight  Pain improved  Denies nausea, vomiting, fever, chills          Objective:       Blood pressure 99/50, pulse 85, temperature 98 9 °F (37 2 °C), temperature source Oral, resp  rate 18, last menstrual period 06/06/2021, SpO2 97 %, not currently breastfeeding  ,There is no height or weight on file to calculate BMI  Intake/Output Summary (Last 24 hours) at 6/19/2021 0545  Last data filed at 6/19/2021 0440  Gross per 24 hour   Intake 1340 ml   Output --   Net 1340 ml       Invasive Devices     Peripheral Intravenous Line            Peripheral IV 06/18/21 Left Antecubital <1 day                Physical Exam:   General: NAD  Head: normocephalic, atraumatic  CV: Pulse regular  Lungs: no conversational dyspnea  Abdomen: soft, non distended, minimally tender right upper quadrant, Saha sign negative , no guarding or rebound  Extremities: FRIEDMAN, motor sensory intact  Neuro: awake, alert, answers questions appropriately      Lab, Imaging and other studies:I have personally reviewed pertinent lab results      VTE Pharmacologic Prophylaxis: Enoxaparin (Lovenox)  VTE Mechanical Prophylaxis: sequential compression device

## 2021-06-19 NOTE — H&P
Acute Care Surgery  History and Physical  Stevenson Valentin 27 y o  female MRN: [id number]  Unit/Bed#: [facility] Encounter: [id number]    Assessment and Plan:  28 yo F with acute cholecystitis, stone in GB neck  AVSS  No leukocytosis  LFTs unremarkable      - admit to surgery  - discussed lap joel with pt including risks and benefits  Pt is amenable to surgery  Will add on for tomorrow am  - clears, NPO p MN  - IVF  - ancef/flagyl  - dvt ppx  - prn pain and antiemetic regimen      History of Present Illness   HPI:  Jeanett Nyhan is a 27 y o  female with no significant past medical history who presents with intermittent right upper quadrant pain for the past couple months  Worsened with food  Pain has significantly worsened since Tuesday, now radiates across entire upper abdomen, feels like squeezing pressure  Has tried antacids without relief  Admits to frequent fried, fatty foods  No associated symptoms  Denies any nausea, vomiting, chest pain, shortness of breath, fevers, chills  Sx hx significant for  3 years ago  Review of Systems   Constitutional: Negative for appetite change, chills and fever  Respiratory: Negative for shortness of breath  Cardiovascular: Negative for chest pain  Gastrointestinal: Positive for abdominal pain (RUQ)  Negative for nausea and vomiting  Genitourinary: Negative for difficulty urinating  All other systems reviewed and are negative  Historical Information   Past Medical History:   Diagnosis Date    HPV in female     Seasonal allergies     Urinary tract infection     Varicella     childhood     Past Surgical History:   Procedure Laterality Date    COLONOSCOPY W/ BIOPSIES      [facility] N[facility]A [facility]    Procedure:  SECTION ();   Surgeon: Tamera Mensah MD;  Location: [facility];  Service: Obstetrics    WISDOM TOOTH EXTRACTION       Social History   Social History     Substance and Sexual Activity   Alcohol Use Yes Comment: rare     Social History     Substance and Sexual Activity   Drug Use No     Social History     Tobacco Use   Smoking Status Former Smoker    Types: Cigarettes   Smokeless Tobacco Never Used     Family History:   Family History   Problem Relation Age of Onset    Sickle cell trait Other     Factor V Leiden deficiency Father     Arthritis Maternal Grandmother     Depression Maternal Grandmother     Miscarriages / Stillbirths Maternal Grandmother     Arthritis Maternal Grandfather     Cancer Maternal Grandfather         prostate       Meds/Allergies   PTA meds:   Prior to Admission Medications   Prescriptions Last Dose Informant Patient Reported? Taking? Sprintec 28 0 25-35 MG-MCG per tablet   No No   Sig: Take 1 tablet by mouth daily PLEASE GIVE SPECIFIC GENERIC SPRINTEC PLEASE CANCEL REMAINING RFS ON PREVIOUS [facility]      Facility-Administered Medications: None     Allergies   Allergen Reactions    Nitrofurantoin Anaphylaxis       Objective   First Vitals:   Blood Pressure: 107/73 (06/18/21 1511)  Pulse: 89 (06/18/21 1511)  Temperature: 98 1 °F (36 7 °C) (06/18/21 1542)  Temp Source: Oral (06/18/21 1542)  Respirations: 16 (06/18/21 1511)  SpO2: 98 % (06/18/21 1511)    Current Vitals:   Blood Pressure: 107/73 (06/18/21 1511)  Pulse: 89 (06/18/21 1511)  Temperature: 98 1 °F (36 7 °C) (06/18/21 1542)  Temp Source: Oral (06/18/21 1542)  Respirations: 16 (06/18/21 1511)  SpO2: 98 % (06/18/21 1511)    No intake or output data in the 24 hours ending 06/18/21 2006    Invasive Devices     Peripheral Intravenous Line            Peripheral IV 06/18/21 Left Antecubital <1 day                Physical Exam  Vitals and nursing note reviewed  Constitutional:       General: She is not in acute distress  Appearance: Normal appearance  She is normal weight  She is not ill-appearing, toxic-appearing or diaphoretic  HENT:      Head: Normocephalic and atraumatic     Cardiovascular:      Rate and Rhythm: Normal rate    Pulmonary:      Effort: Pulmonary effort is normal  No respiratory distress  Abdominal:      General: Abdomen is flat  There is no distension  Palpations: Abdomen is soft  Tenderness: There is abdominal tenderness (RUQ)  There is no guarding  Musculoskeletal:         General: No swelling or tenderness  Skin:     General: Skin is warm and dry  Capillary Refill: Capillary refill takes less than 2 seconds  Neurological:      General: No focal deficit present  Mental Status: She is alert and oriented to person, place, and time  Psychiatric:         Mood and Affect: Mood normal          Behavior: Behavior normal          Lab Results:   I have personally reviewed pertinent lab results  , CBC:   Lab Results   Component Value Date    WBC 9 85 06/18/2021    HGB 14 3 06/18/2021    HCT 43 5 06/18/2021    MCV 93 06/18/2021     06/18/2021    MCH 30 6 06/18/2021    MCHC 32 9 06/18/2021    RDW 12 0 06/18/2021    MPV 9 6 06/18/2021    NRBC 0 06/18/2021   , CMP:   Lab Results   Component Value Date    SODIUM 141 06/18/2021    K 3 9 06/18/2021     06/18/2021    CO2 26 06/18/2021    BUN 13 06/18/2021    CREATININE 0 59 (L) 06/18/2021    CALCIUM 9 1 06/18/2021    AST 17 06/18/2021    ALT 21 06/18/2021    ALKPHOS 59 06/18/2021    EGFR 122 06/18/2021   , Coagulation: No results found for: PT, INR, APTT, Urinalysis:   Lab Results   Component Value Date    COLORU Lisy 06/18/2021    CLARITYU Clear 06/18/2021    SPECGRAV >=1 030 06/18/2021    PHUR 5 5 06/18/2021    LEUKOCYTESUR Negative 06/18/2021    NITRITE Negative 06/18/2021    GLUCOSEU Negative 06/18/2021    KETONESU Negative 06/18/2021    BILIRUBINUR Negative 06/18/2021    BLOODU Moderate (A) 06/18/2021   , Amylase: No results found for: AMYLASE, Lipase:   Lab Results   Component Value Date    LIPASE 92 06/18/2021     Imaging: I have personally reviewed pertinent reports       6/18/21 US abdomen: IMPRESSION:  Gallstones and gallbladder sludge without wall thickening, or pericholecystic fluid  Patient was on pain medications therefore we could not elicit a sonographic Saha's sign      6/18/21 CTAP w contrast:   IMPRESSION:  Distended gallbladder with gallstone noted in the neck with mild gallbladder wall thickening  No pericholecystic inflammation  Findings concerning for acute cholecystitis        EKG, Pathology, and Other Studies: I have personally reviewed pertinent reports  Code Status: Prior  Advance Directive and Living Will:      Power of :    POLST:      Counseling / Coordination of Care  Total floor / unit time spent today 30 minutes  This involved direct patient contact where I performed a full history and physical, reviewed previous records, and reviewed laboratory and other diagnostic studies  Greater than 50% of total time was spent with the patient and / or family counseling and / or coordination of care      Priscilla Victoria PA-C  6/18/2021

## 2021-06-19 NOTE — ANESTHESIA POSTPROCEDURE EVALUATION
Post-Op Assessment Note    CV Status:  Stable  Pain Score: 0    Pain management: adequate     Mental Status:  Alert and awake   Hydration Status:  Euvolemic   PONV Controlled:  Controlled   Airway Patency:  Patent      Post Op Vitals Reviewed: Yes      Staff: Anesthesiologist, CRNA         No complications documented      BP   116/62   Temp   97 1   Pulse  101   Resp   16   SpO2   100%

## 2021-06-19 NOTE — UTILIZATION REVIEW
Initial Clinical Review    Admission: Date/Time/Statement:   Admission Orders (From admission, onward)     Ordered        06/18/21 2017  Place in Observation  Once                   Orders Placed This Encounter   Procedures    Place in Observation     Standing Status:   Standing     Number of Occurrences:   1     Order Specific Question:   Level of Care     Answer:   Med Surg [16]     ED Arrival Information     Expected Arrival Acuity    6/18/2021 6/18/2021 11:21 Urgent         Means of arrival Escorted by Service Admission type    Walk-In Self Surgery-General Urgent         Arrival complaint    Right upper quadrant abdominal pain        Chief Complaint   Patient presents with    Abdominal Pain     PT c/o abdominal pain that is "just at bra line and went around my back like it was squeezing it" PT denies N/D, but has some nausea       Initial Presentation: 27 y o  female with no significant past medical history who presents to ED from home with intermittent right upper quadrant pain for the past couple months  Worsened with food  Pain has significantly worsened since Tuesday, now radiates across entire upper abdomen, feels like squeezing pressure  Has tried antacids without relief  On exam, pt has RUQ tenderness  Imaging shows acute cholecystitis, stone in GB neck  Pt given IVF, IV antiemetic, IV analgesic and IV abx in ED  Bradley Vyas Pt admitted as OBS to general surgery with acute cholecystitis  Plan- IVF, CL, NPO after MN for OR tomorrow, IV abx, DVT ppx, pain control, antiemetics  Date:  6/19  General surgery- continue IV abx, NPO, OR today for lap cholecystectomy  Pt denies N/V  Pt continues with pain 3/10 RUQ abdomen per nursing        ED Triage Vitals   Temperature Pulse Respirations Blood Pressure SpO2   06/18/21 1542 06/18/21 1511 06/18/21 1511 06/18/21 1511 06/18/21 1511   98 1 °F (36 7 °C) 89 16 107/73 98 %      Temp Source Heart Rate Source Patient Position - Orthostatic VS BP Location FiO2 (%)   06/18/21 1542 06/18/21 1511 06/18/21 1511 06/18/21 1511 --   Oral Monitor Sitting Left arm       Pain Score       06/18/21 1903       5          Wt Readings from Last 1 Encounters:   03/29/21 67 6 kg (149 lb)     Additional Vital Signs:   Date/Time  Temp  Pulse  Resp  BP  SpO2    06/19/21 0910  97 9 °F (36 6 °C)  90  18  143/81  99 %    06/19/21 0621  98 1 °F (36 7 °C)  84  18  118/57  96 %    06/19/21 0347  98 9 °F (37 2 °C)  85  18  99/50  97 %        Pertinent Labs/Diagnostic Test Results:   6/18   US abdomen-Gallstones and gallbladder sludge without wall thickening, or pericholecystic fluid  Patient was on pain medications therefore we could not elicit a sonographic Saha's sign  CT abdomen-Distended gallbladder with gallstone noted in the neck with mild gallbladder wall thickening  No pericholecystic inflammation  Findings concerning for acute cholecystitis            Results from last 7 days   Lab Units 06/19/21  0444 06/18/21  1331   WBC Thousand/uL 8 05 9 85   HEMOGLOBIN g/dL 12 4 14 3   HEMATOCRIT % 38 1 43 5   PLATELETS Thousands/uL 316 350   NEUTROS ABS Thousands/µL 4 33 6 68         Results from last 7 days   Lab Units 06/19/21  0444 06/18/21  1331   SODIUM mmol/L 141 141   POTASSIUM mmol/L 3 8 3 9   CHLORIDE mmol/L 108 105   CO2 mmol/L 25 26   ANION GAP mmol/L 8 10   BUN mg/dL 8 13   CREATININE mg/dL 0 56* 0 59*   EGFR ml/min/1 73sq m 124 122   CALCIUM mg/dL 7 9* 9 1     Results from last 7 days   Lab Units 06/19/21  0444 06/18/21  1331   AST U/L 20 17   ALT U/L 28 21   ALK PHOS U/L 57 59   TOTAL PROTEIN g/dL 6 0* 7 7   ALBUMIN g/dL 2 8* 3 7   TOTAL BILIRUBIN mg/dL 0 71 0 42         Results from last 7 days   Lab Units 06/19/21  0444 06/18/21  1331   GLUCOSE RANDOM mg/dL 97 92               Results from last 7 days   Lab Units 06/18/21  1331   LIPASE u/L 92             Results from last 7 days   Lab Units 06/18/21  1626   CLARITY UA  Clear   COLOR UA  Lisy   SPEC GRAV UA  >=1 030   PH UA  5 5   GLUCOSE UA mg/dl Negative   KETONES UA mg/dl Negative   BLOOD UA  Moderate*   PROTEIN UA mg/dl Negative   NITRITE UA  Negative   BILIRUBIN UA  Negative   UROBILINOGEN UA E U /dl 0 2   LEUKOCYTES UA  Negative   WBC UA /hpf 1-2   RBC UA /hpf 10-20*   BACTERIA UA /hpf Occasional   EPITHELIAL CELLS WET PREP /hpf Occasional   MUCUS THREADS  Moderate*         ED Treatment:   Medication Administration from 06/18/2021 1052 to 06/19/2021 0805       Date/Time Order Dose Route Action     06/18/2021 1544 sodium chloride 0 9 % bolus 1,000 mL 1,000 mL Intravenous New Bag     06/18/2021 1543 ondansetron (ZOFRAN) injection 4 mg 4 mg Intravenous Given     06/18/2021 1543 morphine injection 2 mg 2 mg Intravenous Given     06/18/2021 1839 iohexol (OMNIPAQUE) 350 MG/ML injection (SINGLE-DOSE) 100 mL 100 mL Intravenous Given     06/18/2021 2021 piperacillin-tazobactam (ZOSYN) 3 375 g in sodium chloride 0 9 % 100 mL IVPB 3 375 g Intravenous New Bag     06/19/2021 0745 lactated ringers infusion   Intravenous Continue from Pre-op     06/18/2021 2142 lactated ringers infusion 100 mL/hr Intravenous New Bag     06/19/2021 0441 ceFAZolin (ANCEF) IVPB (premix in dextrose) 2,000 mg 50 mL 2,000 mg Intravenous New Bag     06/18/2021 2041 ceFAZolin (ANCEF) IVPB (premix in dextrose) 2,000 mg 50 mL 2,000 mg Intravenous New Bag     06/19/2021 0350 metroNIDAZOLE (FLAGYL) IVPB (premix) 500 mg 100 mL 500 mg Intravenous New Bag     06/18/2021 2057 metroNIDAZOLE (FLAGYL) IVPB (premix) 500 mg 100 mL 500 mg Intravenous New Bag     06/18/2021 2100 acetaminophen (TYLENOL) tablet 650 mg 650 mg Oral Given     06/18/2021 2326 ibuprofen (MOTRIN) tablet 600 mg 600 mg Oral Given        Past Medical History:   Diagnosis Date    HPV in female     Seasonal allergies     Urinary tract infection     Varicella     childhood     Present on Admission:  **None**      Admitting Diagnosis: Abdominal pain [R10 9]  Age/Sex: 27 y o  female  Admission Orders:  Scheduled Medications:  cefazolin, 2,000 mg, Intravenous, Q8H  enoxaparin, 40 mg, Subcutaneous, Daily  metroNIDAZOLE, 500 mg, Intravenous, Q8H      Continuous IV Infusions:  lactated ringers, 100 mL/hr, Intravenous, Continuous      PRN Meds:  acetaminophen, 650 mg, Oral, Q6H PRN  HYDROmorphone, 0 2 mg, Intravenous, Q4H PRN  ibuprofen, 600 mg, Oral, Q6H PRN  ondansetron, 4 mg, Intravenous, Q6H PRN  oxyCODONE, 10 mg, Oral, Q4H PRN  oxyCODONE, 5 mg, Oral, Q4H PRN    SCD's  NPO    Network Utilization Review Department  ATTENTION: Please call with any questions or concerns to [phone] and carefully listen to the prompts so that you are directed to the right person  All voicemails are confidential   Bronwyn Contreras all requests for admission clinical reviews, approved or denied determinations and any other requests to dedicated fax number below belonging to the campus where the patient is receiving treatment   List of dedicated fax numbers for the Facilities:  [address] DENIALS (Administrative/Medical Necessity) [phone]   [address] (Maternity/NICU/Pediatrics) [phone]   [address] Heydi Colindres 1269 [address] [place] [address] [address] [facility] [phone]

## 2021-06-19 NOTE — ANESTHESIA PREPROCEDURE EVALUATION
Procedure:  CHOLECYSTECTOMY LAPAROSCOPIC (N/A Abdomen)    Relevant Problems   ANESTHESIA (within normal limits)      CARDIO (within normal limits)      ENDO (within normal limits)      /RENAL (within normal limits)      PULMONARY (within normal limits)        Physical Exam    Airway    Mallampati score: II  TM Distance: >3 FB  Neck ROM: full     Dental       Cardiovascular  Cardiovascular exam normal    Pulmonary  Pulmonary exam normal     Other Findings        Anesthesia Plan  ASA Score- 1 Emergent    Anesthesia Type- general with ASA Monitors  Additional Monitors:   Airway Plan: ETT  Plan Factors-Exercise tolerance (METS): >4 METS  Chart reviewed  Existing labs reviewed  Patient is not a current smoker  Patient not instructed to abstain from smoking on day of procedure  Patient did not smoke on day of surgery  Induction- intravenous  Postoperative Plan- Plan for postoperative opioid use  Informed Consent- Anesthetic plan and risks discussed with patient  I personally reviewed this patient with the CRNA  Discussed and agreed on the Anesthesia Plan with the CRNA  Jacqueline Figueroa       labs reviewed

## 2021-06-19 NOTE — ED PROVIDER NOTES
History  Chief Complaint   Patient presents with    Abdominal Pain     PT c/o abdominal pain that is "just at bra line and went around my back like it was squeezing it" PT denies N/D, but has some nausea     54-year-old female presenting to the ED for evaluation right upper quadrant abdominal pain  She states it has been coming and going the past several months, worsening lately  She states that the past 3 days been really severe in the evenings after dinner, gradually subsides but never completely goes away  Very strong family history of gallbladder disease in mom and grandma  Denies vomiting but has nausea  Denies any fevers or chills  She rates her pain currently as 7/10  At times she has had some discomfort to her right shoulder and neck region  Abdominal Pain  Pain location:  RUQ  Pain radiates to:  R shoulder and back  Pain severity:  Moderate  Onset quality:  Gradual  Duration:  3 days  Timing:  Constant  Progression:  Waxing and waning  Chronicity:  Recurrent  Associated symptoms: nausea        Prior to Admission Medications   Prescriptions Last Dose Informant Patient Reported? Taking? Sprintec 28 0 25-35 MG-MCG per tablet   No No   Sig: Take 1 tablet by mouth daily PLEASE GIVE SPECIFIC GENERIC SPRINTEC PLEASE CANCEL REMAINING RFS ON PREVIOUS [facility]      Facility-Administered Medications: None       Past Medical History:   Diagnosis Date    HPV in female     Seasonal allergies     Urinary tract infection     Varicella     childhood       Past Surgical History:   Procedure Laterality Date    COLONOSCOPY W/ BIOPSIES      ND  DELIVERY ONLY N/A 2018    Procedure:  SECTION ();   Surgeon: Liat Kraft MD;  [facility];  Service: Obstetrics    WISDOM TOOTH EXTRACTION         Family History   Problem Relation Age of Onset    Sickle cell trait Other     Factor V Leiden deficiency Father     Arthritis Maternal Grandmother     Depression Maternal Grandmother  Miscarriages / Stillbirths Maternal Grandmother     Arthritis Maternal Grandfather     Cancer Maternal Grandfather         prostate     I have reviewed and agree with the history as documented  E-Cigarette/Vaping    E-Cigarette Use Never User      E-Cigarette/Vaping Substances    Nicotine No     THC No     CBD No     Flavoring No     Other No     Unknown No      Social History     Tobacco Use    Smoking status: Former Smoker     Types: Cigarettes    Smokeless tobacco: Never Used   Vaping Use    Vaping Use: Never used   Substance Use Topics    Alcohol use: Yes     Comment: rare    Drug use: No       Review of Systems   Constitutional: Positive for appetite change  Gastrointestinal: Positive for abdominal pain and nausea  All other systems reviewed and are negative  Physical Exam  Physical Exam  Vitals and nursing note reviewed  Constitutional:       General: She is not in acute distress  Appearance: She is well-developed  She is obese  She is not ill-appearing, toxic-appearing or diaphoretic  HENT:      Head: Normocephalic and atraumatic  Mouth/Throat:      Mouth: Mucous membranes are moist    Eyes:      Extraocular Movements: Extraocular movements intact  Cardiovascular:      Rate and Rhythm: Normal rate and regular rhythm  Pulmonary:      Effort: Pulmonary effort is normal       Breath sounds: Normal breath sounds  Abdominal:      General: Abdomen is flat  Bowel sounds are normal  There is no distension or abdominal bruit  There are no signs of injury  Palpations: Abdomen is soft  There is no shifting dullness  Tenderness: There is abdominal tenderness in the right upper quadrant  There is no right CVA tenderness, left CVA tenderness, guarding or rebound  Positive signs include Saha's sign  Negative signs include Rovsing's sign and McBurney's sign  Hernia: No hernia is present     Genitourinary:     Adnexa: Right adnexa normal and left adnexa normal  Skin:     General: Skin is warm and dry  Capillary Refill: Capillary refill takes less than 2 seconds  Neurological:      General: No focal deficit present  Mental Status: She is alert     Psychiatric:         Mood and Affect: Mood normal          Behavior: Behavior normal          Vital Signs  ED Triage Vitals   Temperature Pulse Respirations Blood Pressure SpO2   06/18/21 1542 06/18/21 1511 06/18/21 1511 06/18/21 1511 06/18/21 1511   98 1 °F (36 7 °C) 89 16 107/73 98 %      Temp Source Heart Rate Source Patient Position - Orthostatic VS BP Location FiO2 (%)   06/18/21 1542 06/18/21 1511 06/18/21 1511 06/18/21 1511 --   Oral Monitor Sitting Left arm       Pain Score       06/18/21 1903       5           Vitals:    06/18/21 1511   BP: 107/73   Pulse: 89   Patient Position - Orthostatic VS: Sitting         Visual Acuity      ED Medications  Medications   lactated ringers infusion (has no administration in time range)   ondansetron (ZOFRAN) injection 4 mg (has no administration in time range)   enoxaparin (LOVENOX) subcutaneous injection 40 mg (has no administration in time range)   ceFAZolin (ANCEF) IVPB (premix in dextrose) 2,000 mg 50 mL (has no administration in time range)   metroNIDAZOLE (FLAGYL) IVPB (premix) 500 mg 100 mL (has no administration in time range)   oxyCODONE (ROXICODONE) IR tablet 5 mg (has no administration in time range)   oxyCODONE (ROXICODONE) immediate release tablet 10 mg (has no administration in time range)   HYDROmorphone (DILAUDID) injection 0 2 mg (has no administration in time range)   sodium chloride 0 9 % bolus 1,000 mL (0 mL Intravenous Stopped 6/18/21 1713)   ondansetron (ZOFRAN) injection 4 mg (4 mg Intravenous Given 6/18/21 1543)   morphine injection 2 mg (2 mg Intravenous Given 6/18/21 1543)   iohexol (OMNIPAQUE) 350 MG/ML injection (SINGLE-DOSE) 100 mL (100 mL Intravenous Given 6/18/21 1839)       Diagnostic Studies  Results Reviewed     Procedure Component Value Units Date/Time    Platelet count [[id number]]     Lab Status: No result Specimen: Blood     Urine Microscopic [[id number]]  (Abnormal) Collected: 06/18/21 1626    Lab Status: Final result Specimen: Urine, Clean Catch Updated: 06/18/21 1715     RBC, UA 10-20 /hpf      WBC, UA 1-2 /hpf      Epithelial Cells Occasional /hpf      Bacteria, UA Occasional /hpf      MUCUS THREADS Moderate    POCT pregnancy, urine [[id number]]  (Normal) Resulted: 06/18/21 1629    Lab Status: Final result Specimen: Urine Updated: 06/18/21 1629     EXT PREG TEST UR (Ref: Negative) negative     Control valid    Urine Macroscopic, POC [[id number]]  (Abnormal) Collected: 06/18/21 1626    Lab Status: Final result Specimen: Urine Updated: 06/18/21 1627     Color, UA Lisy     Clarity, UA Clear     pH, UA 5 5     Leukocytes, UA Negative     Nitrite, UA Negative     Protein, UA Negative mg/dl      Glucose, UA Negative mg/dl      Ketones, UA Negative mg/dl      Urobilinogen, UA 0 2 E U /dl      Bilirubin, UA Negative     Blood, UA Moderate     Specific Gravity, UA >=1 030    Narrative:      CLINITEK RESULT    Comprehensive metabolic panel [[id number]]  (Abnormal) Collected: 06/18/21 1331    Lab Status: Final result Specimen: Blood from Arm, Right Updated: 06/18/21 1409     Sodium 141 mmol/L      Potassium 3 9 mmol/L      Chloride 105 mmol/L      CO2 26 mmol/L      ANION GAP 10 mmol/L      BUN 13 mg/dL      Creatinine 0 59 mg/dL      Glucose 92 mg/dL      Calcium 9 1 mg/dL      AST 17 U/L      ALT 21 U/L      Alkaline Phosphatase 59 U/L      Total Protein 7 7 g/dL      Albumin 3 7 g/dL      Total Bilirubin 0 42 mg/dL      eGFR 122 ml/min/1 73sq m     Narrative:      [facility] guidelines for Chronic Kidney Disease (CKD):     Stage 1 with normal or high GFR (GFR > 90 mL/min/1 73 square meters)    Stage 2 Mild CKD (GFR = 60-89 mL/min/1 73 square meters)    Stage 3A Moderate CKD (GFR = 45-59 mL/min/1 73 square meters)    Stage 3B Moderate CKD (GFR = 30-44 mL/min/1 73 square meters)    Stage 4 Severe CKD (GFR = 15-29 mL/min/1 73 square meters)    Stage 5 End Stage CKD (GFR <15 mL/min/1 73 square meters)  Note: GFR calculation is accurate only with a steady state creatinine    Lipase [[id number]]  (Normal) Collected: 06/18/21 1331    Lab Status: Final result Specimen: Blood from Arm, Right Updated: 06/18/21 1409     Lipase 92 u/L     CBC and differential [[id number]] Collected: 06/18/21 1331    Lab Status: Final result Specimen: Blood from Arm, Right Updated: 06/18/21 1341     WBC 9 85 Thousand/uL      RBC 4 67 Million/uL      Hemoglobin 14 3 g/dL      Hematocrit 43 5 %      MCV 93 fL      MCH 30 6 pg      MCHC 32 9 g/dL      RDW 12 0 %      MPV 9 6 fL      Platelets 140 Thousands/uL      nRBC 0 /100 WBCs      Neutrophils Relative 68 %      Immat GRANS % 0 %      Lymphocytes Relative 25 %      Monocytes Relative 6 %      Eosinophils Relative 1 %      Basophils Relative 0 %      Neutrophils Absolute 6 68 Thousands/µL      Immature Grans Absolute 0 03 Thousand/uL      Lymphocytes Absolute 2 41 Thousands/µL      Monocytes Absolute 0 57 Thousand/µL      Eosinophils Absolute 0 12 Thousand/µL      Basophils Absolute 0 04 Thousands/µL                  CT abdomen pelvis with contrast   Final Result by Opal Salinas MD (06/18 1912)      Distended gallbladder with gallstone noted in the neck with mild gallbladder wall thickening  No pericholecystic inflammation  Findings concerning for acute cholecystitis  Workstation performed: [id number]         US abdomen complete   Final Result by Dedrick Beard MD (06/18 1842)      Gallstones and gallbladder sludge without wall thickening, or pericholecystic fluid  Patient was on pain medications therefore we could not elicit a sonographic Saha's sign                    Workstation performed: [id number]                    Procedures  Procedures         ED Course  ED Course as of Jun 18 2034 Fri Jun 18, 2021 1948 CT shows acute cholecystitis  Pt has been in pain for the past 3 days  Improved w/ morphine, but has + lucas's sign  Admit for cholecystectomy  Giving iv abx, zosyn  D/w surgery PA, will be down to see her, Cyril Frank  2014 Surgery PA accepts under Dr Renita Monson  [facility]  Number of Diagnoses or Management Options  Acute cholecystitis: new and requires workup  Diagnosis management comments: 59-year-old female with right upper quadrant pain gradually worsening but constant for the past several days  Associated nausea, radiation to back and right shoulder  Imaging showing acute cholecystitis  Labs are unremarkable, patient is well-appearing in no acute distress but has a moderate amount pain to her right upper abdomen  This is improved with morphine  Discussed with surgical team will admit to hospital for cholecystectomy in the morning  Amount and/or Complexity of Data Reviewed  Clinical lab tests: reviewed and ordered  Tests in the radiology section of CPT®: ordered and reviewed  Discuss the patient with other providers: yes  Independent visualization of images, tracings, or specimens: yes    Risk of Complications, Morbidity, and/or Mortality  Presenting problems: moderate  Diagnostic procedures: moderate  Management options: moderate    Patient Progress  Patient progress: stable      Disposition  Final diagnoses:   Acute cholecystitis     Time reflects when diagnosis was documented in both MDM as applicable and the Disposition within this note     Time User Action Codes Description Comment    6/18/2021  8:16 PM Emeterio Natanael Add [K81 0] Acute cholecystitis       ED Disposition     ED Disposition Condition Date/Time Comment    Admit Stable Fri Jun 18, 2021  8:16 PM Case was discussed with Cyril Frank and the patient's admission status was agreed to be Admission Status: observation status to the service of Dr Renita Monson   Follow-up Information    None         Patient's Medications   Discharge Prescriptions    No medications on file     No discharge procedures on file      PDMP Review     None          ED Provider  Electronically Signed by           Hilda Poole DO  06/18/21 2038

## 2021-06-21 ENCOUNTER — TRANSITIONAL CARE MANAGEMENT (OUTPATIENT)
Dept: FAMILY MEDICINE CLINIC | Facility: CLINIC | Age: 31
End: 2021-06-21

## 2021-07-06 ENCOUNTER — OFFICE VISIT (OUTPATIENT)
Dept: SURGERY | Facility: CLINIC | Age: 31
End: 2021-07-06

## 2021-07-06 DIAGNOSIS — K43.2: Primary | ICD-10-CM

## 2021-07-06 PROCEDURE — 99024 POSTOP FOLLOW-UP VISIT: CPT | Performed by: SURGERY

## 2021-07-06 NOTE — PROGRESS NOTES
Assessment/Plan:  Patient returns after undergoing laparoscopic cholecystectomy  She feels well  She has advance her activities nicely  Incisions are clean and healing well  Wound care instructions provided  Diagnoses and all orders for this visit:    Postoperative abdominal hernia        Pathology: Reviewed with patient, all questions answered  Postoperative restrictions reviewed  All questions answered  ______________________________________________________  HPI: Patient presents post operatively  Laparoscopic cholecystectomy 6/19/2021  Final Diagnosis  A  Gallbladder, cholecystectomy:  -  Chronic cholecystitis with cholelithiasis  ROS:  General ROS: negative for - chills, fatigue, fever or night sweats, weight loss  Respiratory ROS: no cough, shortness of breath, or wheezing  Cardiovascular ROS: no chest pain or dyspnea on exertion  Genito-Urinary ROS: no dysuria, trouble voiding, or hematuria  Musculoskeletal ROS: negative for - gait disturbance, joint pain or muscle pain  Neurological ROS: no TIA or stroke symptoms  GI ROS: see HPI  Skin ROS: no new rashes or lesions   Lymphatic ROS: no new adenopathy noted by pt     GYN ROS: see HPI, no new GYN history or bleeding noted  Psy ROS: no new mental or behavioral disturbances         Patient Active Problem List   Diagnosis    Preventative health care    Postoperative abdominal hernia       Allergies:  Nitrofurantoin      Current Outpatient Medications:     Sprintec 28 0 25-35 MG-MCG per tablet, Take 1 tablet by mouth daily PLEASE GIVE SPECIFIC GENERIC SPRINTEC PLEASE CANCEL REMAINING RFS ON PREVIOUS PRESC, Disp: 28 tablet, Rfl: 4    Past Medical History:   Diagnosis Date    HPV in female     Seasonal allergies     Urinary tract infection     Varicella     childhood       Past Surgical History:   Procedure Laterality Date    CHOLECYSTECTOMY LAPAROSCOPIC N/A 6/19/2021    Procedure: CHOLECYSTECTOMY LAPAROSCOPIC;  Surgeon: Ishan Olvera, ;  [facility];  Service: General    COLONOSCOPY W/ BIOPSIES      NM  DELIVERY ONLY N/A 2018    Procedure:  SECTION (); Surgeon: Dolores Quezada MD;  Location: [facility] ;  Service: Obstetrics    WISDOM TOOTH EXTRACTION         Family History   Problem Relation Age of Onset    Sickle cell trait Other     Factor V Leiden deficiency Father     Arthritis Maternal Grandmother     Depression Maternal Grandmother     Miscarriages / Stillbirths Maternal Grandmother     Arthritis Maternal Grandfather     Cancer Maternal Grandfather         prostate        reports that she has quit smoking  Her smoking use included cigarettes  She has never used smokeless tobacco  She reports current alcohol use  She reports that she does not use drugs      PHYSICAL EXAM    LMP 2021     General: normal, cooperative, no distress  Abdominal: soft, nondistended or nontender  Incision: clean, dry, and intact and healing well      Evonne Mckeon MD    Date: 2021 Time: 9:10 PM

## 2021-07-29 DIAGNOSIS — Z30.41 ENCOUNTER FOR SURVEILLANCE OF CONTRACEPTIVE PILLS: ICD-10-CM

## 2022-01-26 DIAGNOSIS — Z30.41 ENCOUNTER FOR SURVEILLANCE OF CONTRACEPTIVE PILLS: ICD-10-CM

## 2022-03-02 ENCOUNTER — TELEPHONE (OUTPATIENT)
Dept: FAMILY MEDICINE CLINIC | Facility: CLINIC | Age: 32
End: 2022-03-02

## 2022-03-02 NOTE — TELEPHONE ENCOUNTER
Patient has not been here since 11/30/2018  She is currently a patient with:    [phone]   Elise Brown    [address]   [place], [address] [facility]   [phone] (Fax)      Please remove Dr Xander Krueger as PCP

## 2022-05-26 NOTE — TELEPHONE ENCOUNTER
05/26/22 4:51 PM     Thank you for your request  Your request has been received, reviewed, and the patient chart updated  The PCP has successfully been removed with a patient attribution note  This message will now be completed      Thank you  Laverne Oliveros

## 2024-01-04 ENCOUNTER — OFFICE VISIT (OUTPATIENT)
Dept: OBGYN CLINIC | Facility: CLINIC | Age: 34
End: 2024-01-04
Payer: COMMERCIAL

## 2024-01-04 VITALS
DIASTOLIC BLOOD PRESSURE: 74 MMHG | HEIGHT: 63 IN | SYSTOLIC BLOOD PRESSURE: 116 MMHG | WEIGHT: 151.2 LBS | BODY MASS INDEX: 26.79 KG/M2

## 2024-01-04 DIAGNOSIS — N92.6 IRREGULAR MENSES: ICD-10-CM

## 2024-01-04 DIAGNOSIS — Z01.419 ENCOUNTER FOR GYNECOLOGICAL EXAMINATION (GENERAL) (ROUTINE) WITHOUT ABNORMAL FINDINGS: Primary | ICD-10-CM

## 2024-01-04 DIAGNOSIS — Z30.41 ENCOUNTER FOR SURVEILLANCE OF CONTRACEPTIVE PILLS: ICD-10-CM

## 2024-01-04 LAB — SL AMB POCT URINE HCG: NORMAL

## 2024-01-04 PROCEDURE — S0610 ANNUAL GYNECOLOGICAL EXAMINA: HCPCS | Performed by: STUDENT IN AN ORGANIZED HEALTH CARE EDUCATION/TRAINING PROGRAM

## 2024-01-04 PROCEDURE — 81025 URINE PREGNANCY TEST: CPT | Performed by: STUDENT IN AN ORGANIZED HEALTH CARE EDUCATION/TRAINING PROGRAM

## 2024-01-04 RX ORDER — NORGESTIMATE AND ETHINYL ESTRADIOL 0.25-0.035
1 KIT ORAL DAILY
Qty: 28 TABLET | Refills: 4 | Status: SHIPPED | OUTPATIENT
Start: 2024-01-04 | End: 2024-05-23

## 2024-01-04 NOTE — ASSESSMENT & PLAN NOTE
-UPT in office today negative   -TSH and prolactin ordered   -continue sprintec, refill given today. Discussed that one or two months with missed cycle is okay, but if it continues we change type of contraception. Pt would like to get pregnant next year, not interested in LARC.

## 2024-01-04 NOTE — ASSESSMENT & PLAN NOTE
-pap: 9/29/21 NILM HPV neg  -LMP: 11/20/23  -denies dysmenorrhea or menorrhagia   -sexually active, denies dyspareunia  -STD testing: denies  -smoking: denies  -drinks alcohol socially  -recommend 30 min of exercise daily   -denies family history of ovarian, breast, colon cancer  -return in one year or earlier if needed

## 2024-01-04 NOTE — PROGRESS NOTES
Assessment/Plan:    Encounter for gynecological examination (general) (routine) without abnormal findings  -pap: 21 NILM HPV neg  -LMP: 23  -denies dysmenorrhea or menorrhagia   -sexually active, denies dyspareunia  -STD testing: denies  -smoking: denies  -drinks alcohol socially  -recommend 30 min of exercise daily   -denies family history of ovarian, breast, colon cancer  -return in one year or earlier if needed     Irregular menses  -UPT in office today negative   -TSH and prolactin ordered   -continue sprintec, refill given today. Discussed that one or two months with missed cycle is okay, but if it continues we change type of contraception. Pt would like to get pregnant next year, not interested in LARC.      Diagnoses and all orders for this visit:    Encounter for gynecological examination (general) (routine) without abnormal findings    Irregular menses  -     TSH, 3rd generation with Free T4 reflex; Future  -     Prolactin; Future  -     POCT urine HCG    Encounter for surveillance of contraceptive pills  -     norgestimate-ethinyl estradiol (Sprintec 28) 0.25-35 MG-MCG per tablet; Take 1 tablet by mouth daily          Subjective:      Patient ID: Purnima Valentin is a 33 y.o. female.    34yo  LMP 23 presents for annual exam. Pt reports she has been on sprintec for many years, she missed two pills in the month of November and had some intermenstrual spotting. Pt reports she did not get a period in December when she was scheduled to. She took a pregnancy test this morning which was negative. This is the first time she has had issues with the birth control        The following portions of the patient's history were reviewed and updated as appropriate: allergies, current medications, past family history, past medical history, past social history, past surgical history, and problem list.    Review of Systems   Constitutional:  Negative for appetite change, chills, fatigue and fever.  "  Respiratory:  Negative for cough, chest tightness, shortness of breath and wheezing.    Cardiovascular:  Negative for chest pain, palpitations and leg swelling.   Gastrointestinal:  Negative for abdominal distention, abdominal pain, constipation, diarrhea, nausea and vomiting.   Endocrine: Negative for cold intolerance, heat intolerance and polyuria.   Genitourinary:  Positive for menstrual problem. Negative for difficulty urinating, dyspareunia, dysuria, genital sores, vaginal bleeding, vaginal discharge and vaginal pain.   Neurological:  Negative for dizziness, weakness, light-headedness and headaches.         Objective:      /74 (BP Location: Left arm, Patient Position: Sitting, Cuff Size: Standard)   Ht 5' 2.5\" (1.588 m)   Wt 68.6 kg (151 lb 3.2 oz)   LMP 11/20/2023 (Approximate)   BMI 27.21 kg/m²          Physical Exam  Constitutional:       General: She is not in acute distress.     Appearance: Normal appearance. She is normal weight.   Cardiovascular:      Rate and Rhythm: Normal rate.   Pulmonary:      Effort: Pulmonary effort is normal. No respiratory distress.   Chest:      Chest wall: No mass or tenderness.   Breasts:     Breasts are symmetrical.      Right: No swelling, bleeding, inverted nipple, mass, nipple discharge, skin change or tenderness.      Left: No swelling, bleeding, inverted nipple, mass, nipple discharge, skin change or tenderness.   Abdominal:      General: There is no distension.      Palpations: There is no mass.      Tenderness: There is no abdominal tenderness. There is no guarding or rebound.   Genitourinary:     General: Normal vulva.      Exam position: Lithotomy position.      Pubic Area: No rash.       Labia:         Right: No rash, tenderness, lesion or injury.         Left: No rash, tenderness, lesion or injury.       Urethra: No prolapse, urethral pain, urethral swelling or urethral lesion.      Vagina: No signs of injury. No vaginal discharge, tenderness, " bleeding, lesions or prolapsed vaginal walls.      Cervix: No cervical motion tenderness, discharge, friability, lesion or erythema.      Uterus: Not deviated, not enlarged, not fixed, not tender and no uterine prolapse.       Adnexa:         Right: No mass, tenderness or fullness.          Left: No mass, tenderness or fullness.     Musculoskeletal:      Right lower leg: No edema.      Left lower leg: No edema.   Lymphadenopathy:      Upper Body:      Right upper body: No supraclavicular, axillary or pectoral adenopathy.      Left upper body: No supraclavicular, axillary or pectoral adenopathy.   Neurological:      Mental Status: She is alert and oriented to person, place, and time.   Psychiatric:         Mood and Affect: Mood normal.

## (undated) DEVICE — TROCAR: Brand: KII FIOS FIRST ENTRY

## (undated) DEVICE — NEEDLE 22 G X 1 1/2 SAFETY

## (undated) DEVICE — SUT VICRYL 0 CTX 36 IN J978H

## (undated) DEVICE — ADHESIVE SKIN HIGH VISCOSITY EXOFIN 1ML

## (undated) DEVICE — PACK C-SECTION PBDS

## (undated) DEVICE — SKIN MARKER DUAL TIP WITH RULER CAP, FLEXIBLE RULER AND LABELS: Brand: DEVON

## (undated) DEVICE — ADHESIVE SKN CLSR HISTOACRYL FLEX 0.5ML LF

## (undated) DEVICE — Device

## (undated) DEVICE — HARMONIC 1100 SHEARS, 36CM SHAFT LENGTH: Brand: HARMONIC

## (undated) DEVICE — GLOVE SRG BIOGEL 7

## (undated) DEVICE — GLOVE SRG BIOGEL ORTHOPEDIC 8

## (undated) DEVICE — INTENDED FOR TISSUE SEPARATION, AND OTHER PROCEDURES THAT REQUIRE A SHARP SURGICAL BLADE TO PUNCTURE OR CUT.: Brand: BARD-PARKER SAFETY BLADES SIZE 11, STERILE

## (undated) DEVICE — TELFA NON-ADHERENT ABSORBENT DRESSING: Brand: TELFA

## (undated) DEVICE — TUBING SMOKE EVAC W/FILTRATION DEVICE PLUMEPORT ACTIV

## (undated) DEVICE — GLOVE SRG BIOGEL 7.5

## (undated) DEVICE — INSUFFLATION TUBING PRIMFLO

## (undated) DEVICE — CHLORAPREP HI-LITE 26ML ORANGE

## (undated) DEVICE — GAUZE SPONGES,16 PLY: Brand: CURITY

## (undated) DEVICE — VIAL DECANTER

## (undated) DEVICE — SUT MONOCRYL 4-0 PS-2 27 IN Y426H

## (undated) DEVICE — SUT VICRYL 0 CT-1 27 IN J260H

## (undated) DEVICE — COTTON TIP APPLICTOR 2 PK

## (undated) DEVICE — ALLENTOWN LAP CHOLE APP PACK: Brand: CARDINAL HEALTH

## (undated) DEVICE — TROCAR: Brand: KII® SLEEVE

## (undated) DEVICE — ABDOMINAL PAD: Brand: DERMACEA

## (undated) DEVICE — DRAPE EQUIPMENT RF WAND

## (undated) DEVICE — LIGAMAX 5 MM ENDOSCOPIC MULTIPLE CLIP APPLIER: Brand: LIGAMAX

## (undated) DEVICE — LIGHT HANDLE COVER SLEEVE DISP BLUE STELLAR

## (undated) DEVICE — SUT VICRYL 0 UR-6 27 IN J603H